# Patient Record
Sex: FEMALE | Race: WHITE | NOT HISPANIC OR LATINO | URBAN - METROPOLITAN AREA
[De-identification: names, ages, dates, MRNs, and addresses within clinical notes are randomized per-mention and may not be internally consistent; named-entity substitution may affect disease eponyms.]

---

## 2020-12-09 ENCOUNTER — OUTPATIENT (OUTPATIENT)
Dept: OUTPATIENT SERVICES | Facility: HOSPITAL | Age: 80
LOS: 1 days | End: 2020-12-09
Payer: MEDICARE

## 2020-12-09 DIAGNOSIS — I34.0 NONRHEUMATIC MITRAL (VALVE) INSUFFICIENCY: ICD-10-CM

## 2020-12-09 DIAGNOSIS — I34.1 NONRHEUMATIC MITRAL (VALVE) PROLAPSE: ICD-10-CM

## 2020-12-09 PROCEDURE — 93325 DOPPLER ECHO COLOR FLOW MAPG: CPT | Mod: 26

## 2020-12-09 PROCEDURE — 76376 3D RENDER W/INTRP POSTPROCES: CPT | Mod: 26

## 2020-12-09 PROCEDURE — 93312 ECHO TRANSESOPHAGEAL: CPT

## 2020-12-09 PROCEDURE — 93312 ECHO TRANSESOPHAGEAL: CPT | Mod: 26

## 2020-12-28 DIAGNOSIS — I34.0 NONRHEUMATIC MITRAL (VALVE) INSUFFICIENCY: ICD-10-CM

## 2020-12-28 DIAGNOSIS — Z86.79 PERSONAL HISTORY OF OTHER DISEASES OF THE CIRCULATORY SYSTEM: ICD-10-CM

## 2020-12-28 DIAGNOSIS — S82.891D OTHER FRACTURE OF RIGHT LOWER LEG, SUBSEQUENT ENCOUNTER FOR CLOSED FRACTURE WITH ROUTINE HEALING: ICD-10-CM

## 2020-12-29 ENCOUNTER — APPOINTMENT (OUTPATIENT)
Dept: CARDIOTHORACIC SURGERY | Facility: CLINIC | Age: 80
End: 2020-12-29
Payer: MEDICARE

## 2020-12-29 DIAGNOSIS — Z86.39 PERSONAL HISTORY OF OTHER ENDOCRINE, NUTRITIONAL AND METABOLIC DISEASE: ICD-10-CM

## 2020-12-29 DIAGNOSIS — Z87.19 PERSONAL HISTORY OF OTHER DISEASES OF THE DIGESTIVE SYSTEM: ICD-10-CM

## 2020-12-29 DIAGNOSIS — Z86.79 PERSONAL HISTORY OF OTHER DISEASES OF THE CIRCULATORY SYSTEM: ICD-10-CM

## 2020-12-29 DIAGNOSIS — Z87.891 PERSONAL HISTORY OF NICOTINE DEPENDENCE: ICD-10-CM

## 2020-12-29 PROCEDURE — 99072 ADDL SUPL MATRL&STAF TM PHE: CPT

## 2020-12-29 PROCEDURE — 99205 OFFICE O/P NEW HI 60 MIN: CPT

## 2020-12-29 PROCEDURE — 99204 OFFICE O/P NEW MOD 45 MIN: CPT

## 2020-12-30 ENCOUNTER — NON-APPOINTMENT (OUTPATIENT)
Age: 80
End: 2020-12-30

## 2020-12-30 PROBLEM — Z87.891 FORMER SMOKER: Status: ACTIVE | Noted: 2020-12-30

## 2020-12-30 PROBLEM — Z86.39 HISTORY OF HYPERCALCEMIA: Status: RESOLVED | Noted: 2020-12-30 | Resolved: 2020-12-30

## 2020-12-30 PROBLEM — Z87.19 HISTORY OF GASTROESOPHAGEAL REFLUX (GERD): Status: RESOLVED | Noted: 2020-12-30 | Resolved: 2020-12-30

## 2020-12-30 PROBLEM — Z86.79 HISTORY OF MITRAL VALVE PROLAPSE: Status: RESOLVED | Noted: 2020-12-30 | Resolved: 2020-12-30

## 2020-12-30 RX ORDER — MULTIVIT-MIN/FOLIC/VIT K/LYCOP 400-300MCG
25 MCG TABLET ORAL DAILY
Refills: 0 | Status: ACTIVE | COMMUNITY

## 2020-12-30 NOTE — REVIEW OF SYSTEMS
[Palpitations] : palpitations [Lower Ext Edema] : lower extremity edema [SOB on Exertion] : shortness of breath during exertion [Negative] : Heme/Lymph

## 2020-12-31 NOTE — PHYSICAL EXAM
[facility] [place]    [id number] ANSHUL MARR    [facility] [place] [address]    Phone:  [phone]    Fax:  [phone]       Thank You for choosing us for your health care visit. We are glad to serve you and happy to provide you with this summary of your visit. Please help us to ensure we have accurate records. If you find anything that needs to be changed, please let our staff know as soon as possible.          Your Demographic Information     Patient Name Sex Sarah Hunt Female 1931       Ethnic Group Patient Race    Not of  or  Origin White      Your Visit Details     Date & Time Provider Department    2017 9:00 AM Saúl Glass MD [facility] [place]      Conditions Discussed Today or Order-Related Diagnoses        Comments    Adhesive capsulitis of right shoulder    -  Primary given depo medrol 80 mg injected into subacromial bursa and given exercised      Your Vitals Were     BP Pulse Weight BMI Smoking Status       100/68 (BP Location: RUE, Patient Position: Sitting, Cuff Size: Regular) 60 148 lb 4.8 oz (67.3 kg) 29.95 kg/m2 Former Smoker       Medications Prescribed or Re-Ordered Today     None      Your Current Medications Are        Disp Refills Start End    simvastatin (ZOCOR) 20 MG tablet 60 tablet 0 2017     Sig - Route: Take 1 tablet by mouth nightly. - Oral    Class: Eprescribe    Notes to Pharmacy: No further refills until evaluated in the clinic. Please call our office to schedule an appointment.    lisinopril (ZESTRIL) 10 MG tablet 60 tablet 0 2017     Sig - Route: Take 1 tablet by mouth daily. - Oral    Class: Eprescribe    Notes to Pharmacy: No further refills until evaluated in the clinic. Please call our office to schedule an appointment.    ibuprofen (MOTRIN) 600 MG tablet 60 tablet 1 2017     Sig: Take 1 tablet by mouth with a meal once to twice daily as needed for pain    Class: Eprescribe    Notes  [General Appearance - Alert] : alert to Pharmacy: No further refills until evaluated in the clinic. Please call our office to schedule an appointment.    gabapentin (NEURONTIN) 100 MG capsule 240 capsule 0 4/25/2017     Sig: Take 2 capsules by mouth twice daily with food    Class: Eprescribe    Notes to Pharmacy: No further refills until evaluated in the clinic. Please call our office to schedule an appointment.    amLODIPine (NORVASC) 5 MG tablet 60 tablet 0 4/25/2017     Sig - Route: Take 1 tablet by mouth daily. - Oral    Class: Eprescribe    Notes to Pharmacy: No further refills until evaluated in the clinic. Please call our office to schedule an appointment.    ALPRAZolam (XANAX) 0.5 MG tablet 30 tablet 1 4/25/2017     Sig - Route: Take 1 tablet by mouth nightly as needed for Sleep. - Oral    ranitidine (ZANTAC) 150 MG tablet 180 tablet 3 6/19/2015     Sig - Route: Take 1 tablet by mouth 2 times daily. - Oral    Class: Eprescribe    GLUCOSAMINE HCL PO        Class: Historical Med    Route: Oral    aspirin 81 MG tablet        Sig - Route: Take 81 mg by mouth 2 times daily. - Oral    Class: Historical Med    lisinopril (ZESTRIL) 10 MG tablet 90 tablet 0 3/3/2017     Sig: TAKE 1 TABLET BY MOUTH DAILY    Class: Eprescribe    albuterol 108 (90 BASE) MCG/ACT inhaler 1 Inhaler 3 1/10/2017     Sig - Route: Inhale 2 puffs into the lungs every 4 hours as needed for Shortness of Breath or Wheezing. - Inhalation    Class: Eprescribe      Allergies     Ambien WEAKNESS    Singulair INSOMNIA      Immunizations History as of 5/26/2017     Name Date    HERPES ZOSTER SHINGLES 10/1/2007    INFLUENZA QUADRIVALENT 10/29/2015    Influenza 9/25/2014, 10/8/2013, 10/9/2012, 10/11/2011, 10/26/2010, 9/29/2009, 10/15/2008, 11/4/2006, 10/11/2005, 10/28/2004, 10/19/2001    Pneumococcal Conjugate 13 Valent 10/29/2015    Pneumococcal Polysaccharide Adult 6/4/1999    Polio, ORAL 6/4/1999, 3/27/1973    Td:Adult type tetanus/diphtheria 1/1/2000    Tdap 10/29/2015 10:33 AM       Problem List as of 5/26/2017     Sciatica of left side    Essential hypertension, benign    Brachial neuritis or radiculitis NOS    Pure hypercholesterolemia    Medicare annual wellness visit, subsequent    Dermatophytosis of nail    Bilateral foot pain            Patient Instructions     None       [General Appearance - In No Acute Distress] : in no acute distress [Sclera] : the sclera and conjunctiva were normal [PERRL With Normal Accommodation] : pupils were equal in size, round, and reactive to light [Extraocular Movements] : extraocular movements were intact [Outer Ear] : the ears and nose were normal in appearance [Oropharynx] : the oropharynx was normal [Neck Appearance] : the appearance of the neck was normal [Neck Cervical Mass (___cm)] : no neck mass was observed [Jugular Venous Distention Increased] : there was no jugular-venous distention [Thyroid Diffuse Enlargement] : the thyroid was not enlarged [Thyroid Nodule] : there were no palpable thyroid nodules [Auscultation Breath Sounds / Voice Sounds] : lungs were clear to auscultation bilaterally [Heart Rate And Rhythm] : heart rate was normal and rhythm regular [Examination Of The Chest] : the chest was normal in appearance [Chest Visual Inspection Thoracic Asymmetry] : no chest asymmetry [Diminished Respiratory Excursion] : normal chest expansion [2+] : left 2+ [No Abnormalities] : the abdominal aorta was not enlarged and no bruit was heard [Bowel Sounds] : normal bowel sounds [Abdomen Soft] : soft [Abdomen Tenderness] : non-tender [Abdomen Mass (___ Cm)] : no abdominal mass palpated [No CVA Tenderness] : no ~M costovertebral angle tenderness [No Spinal Tenderness] : no spinal tenderness [Abnormal Walk] : normal gait [Nail Clubbing] : no clubbing  or cyanosis of the fingernails [Musculoskeletal - Swelling] : no joint swelling seen [Motor Tone] : muscle strength and tone were normal [Skin Color & Pigmentation] : normal skin color and pigmentation [Skin Turgor] : normal skin turgor [] : no rash [Deep Tendon Reflexes (DTR)] : deep tendon reflexes were 2+ and symmetric [Sensation] : the sensory exam was normal to light touch and pinprick [No Focal Deficits] : no focal deficits [Oriented To Time, Place, And Person] : oriented to person, place, and time [Impaired Insight] : insight and judgment were intact [Affect] : the affect was normal [Right Carotid Bruit] : no bruit heard over the right carotid [Left Carotid Bruit] : no bruit heard over the left carotid [Right Femoral Bruit] : no bruit heard over the right femoral artery [Left Femoral Bruit] : no bruit heard over the left femoral artery [FreeTextEntry1] : trace mayra pedal edema

## 2020-12-31 NOTE — DATA REVIEWED
[FreeTextEntry1] : 12/9/20 SARAH: \par 1. Normal left ventricular size and systolic function.\par 2. Normal right ventricular size and systolic function.\par 3. No LA/RA/POLINA/RAA thrombus seen.\par 4. There is mitral valve prolapse of the posterior leaflet (P2 scallop) . The P2 scallop is also flail. There is severe mitral regurgitation seen at a blood pressure of 180/86 mmHg. The jet of mitral regurgitation is holosystolic, eccentric and anteriorly directed. Mean mitral valve gradient is 2.5 mmHg at a HR of 88 bpm and Vmax = 1.4 m/s\par 5. The mitral valve area by 3D planimetry is 6.5 cm2.\par 6. There is suggestion of S wave reversal in the left lower pulmonary vein.\par 7. Mild aortic regurgitation.\par 8. Moderate tricuspid regurgitation.\par 9. No evidence of pulmonary hypertension.\par 10. Intracardiac shunt is present; most consistent with a patent foramen ovale.\par 11. no pericardial effusion\par \par 11/24/20 TTE: \par -LVEF 70%\par -mild LVH\par -normal RV systolic size and function\par -dilated left atrium\par -trace AR\par -posterior leaflet mvp, moderate to severe MR\par -mild-moderate TR\par -mild pulm htn, pas 35 mmHg\par

## 2020-12-31 NOTE — CONSULT LETTER
[Dear  ___] : Dear  [unfilled], [Please see my note below.] : Please see my note below. [Sincerely,] : Sincerely, [FreeTextEntry2] : Caren Barr M.D.\par [address], [address] floor\[place] [address] [FreeTextEntry1] : Please find attached our consultation on your patient, CONCHA HERNANDEZ \par We take a multidisciplinary team approach to patient care and consider you, the referring physician, an extension of our team. We will maintain an open line of communication with you throughout your patient's treatment course.  \par It is our commitment to provide your patient with the highest quality of advanced therapeutic options. We thank you for allowing us to participate in the care of your patient.\par Please do not hesitate to contact our team with any questions or concerns at [phone].\par  [FreeTextEntry3] : Antonio Wilcox MD, FRCS\par \par [facility] \par Department of Cardiothoracic  Surgery \par Director of Robotic Cardiac Surgery\par Professor of Cardiovascular & Thoracic Surgery\par [employer] \par \par [facility]\par

## 2020-12-31 NOTE — PHYSICAL EXAM
[General Appearance - Alert] : alert [General Appearance - In No Acute Distress] : in no acute distress [Sclera] : the sclera and conjunctiva were normal [PERRL With Normal Accommodation] : pupils were equal in size, round, and reactive to light [Extraocular Movements] : extraocular movements were intact [Outer Ear] : the ears and nose were normal in appearance [Oropharynx] : the oropharynx was normal [Neck Appearance] : the appearance of the neck was normal [Neck Cervical Mass (___cm)] : no neck mass was observed [Jugular Venous Distention Increased] : there was no jugular-venous distention [Thyroid Diffuse Enlargement] : the thyroid was not enlarged [Thyroid Nodule] : there were no palpable thyroid nodules [Auscultation Breath Sounds / Voice Sounds] : lungs were clear to auscultation bilaterally [Heart Rate And Rhythm] : heart rate was normal and rhythm regular [Examination Of The Chest] : the chest was normal in appearance [Chest Visual Inspection Thoracic Asymmetry] : no chest asymmetry [Diminished Respiratory Excursion] : normal chest expansion [2+] : left 2+ [No Abnormalities] : the abdominal aorta was not enlarged and no bruit was heard [Bowel Sounds] : normal bowel sounds [Abdomen Soft] : soft [Abdomen Tenderness] : non-tender [Abdomen Mass (___ Cm)] : no abdominal mass palpated [No CVA Tenderness] : no ~M costovertebral angle tenderness [No Spinal Tenderness] : no spinal tenderness [Abnormal Walk] : normal gait [Nail Clubbing] : no clubbing  or cyanosis of the fingernails [Musculoskeletal - Swelling] : no joint swelling seen [Motor Tone] : muscle strength and tone were normal [Skin Color & Pigmentation] : normal skin color and pigmentation [Skin Turgor] : normal skin turgor [] : no rash [Deep Tendon Reflexes (DTR)] : deep tendon reflexes were 2+ and symmetric [Sensation] : the sensory exam was normal to light touch and pinprick [No Focal Deficits] : no focal deficits [Oriented To Time, Place, And Person] : oriented to person, place, and time [Impaired Insight] : insight and judgment were intact [Affect] : the affect was normal [Right Carotid Bruit] : no bruit heard over the right carotid [Left Carotid Bruit] : no bruit heard over the left carotid [Right Femoral Bruit] : no bruit heard over the right femoral artery [Left Femoral Bruit] : no bruit heard over the left femoral artery [FreeTextEntry1] : trace mayra pedal edema

## 2020-12-31 NOTE — END OF VISIT
[FreeTextEntry3] : I, TANGELA JIM , am scribing for and in the presence of KRISTIE ALVARENGA the following sections: History of present illness, past Medical/family/surgical/family/social history, review of systems, vital signs, physical exam and disposition.\par I personally performed the services described in the documentation, reviewed the documentation recorded by the scribe in my presence and it accurately and completely records my words and actions.\par

## 2020-12-31 NOTE — ASSESSMENT
[FreeTextEntry1] : 80 year old female with history of HTN, hypercalcemia and mitral valve prolapse presents with severe MR with worsening SOB /GRAMAJO and palpitations.  Dr. Wilcox reviewed the echocardiogram images with the patient and discussed the case with Dr. Aburto.  Dr. Wilcox discussed the risks, benefits and alternatives to surgery and the various surgical approaches, minimally invasive versus sternotomy.  Risks include but not limited to death, heart attack, bleeding, stroke, kidney problems and infection.  Dr. Wilcox quoted a 2-3% operative mortality and complication risk and 98% chance that the mitral valve can be repaired. He discussed the need for left heart cardiac catherization prior to surgery for evaluate for coronary artery disease. If she has no CAD the approach will be minimally invasive. Dr. Wilcox feels the patient will benefit from and is a candidate for mitral valve repair.  All questions were addressed. The patient will discuss with her family and call us to schedule. \par \par Plan: \par Covid test \par admit 1 day prior for LHC via radial artery\par \par \par

## 2020-12-31 NOTE — HISTORY OF PRESENT ILLNESS
[FreeTextEntry1] : 80 year old female with a history of hypertension, GERD, chronic diastolic heart failure with severe mitral regurgitation who was referred for further evaluation of her valvular disease. \par \par The patient states, for the last six months, she has experienced shortness of breath after walking up a flight of stairs. She denies any SOB at rest, chest pain, palpitations, dizziness, syncope, or LE edema. \par \par The patient had an outpatient ECHO on 11/2020 which showed moderate to severe MR, followed by a SARAH on 12/2020 which confirmed severe mitral regurgitation. \par \par The patient lives with her son and remains independent in all her ADLs

## 2020-12-31 NOTE — HISTORY OF PRESENT ILLNESS
[FreeTextEntry1] : 80 year old female with PMH of htn, hypercalcemia and mitral valve prolapse presents with severe mitral regurgitation. Patient followed by PCP, Dr. Dsouza and cardiologist Dr. Barr. She has known of mvp with heart murmur x many years. She reports that recently experiencing GRAMAJO when going up 2 flights of stairs to her apt, prn palpitations and lower extremity edema. NYHA II. She denies c/o chest pain, sob at rest, fever, orthopnea, PND or syncope. She lives with her son and remains independent in ADLs. TTE in November 2020 revealed EF 70% and moderate to severe MR. SARAH December 2020 revealed severe MVP w/severe MR and flail segment of posterior leaflet. \par \par She presents today for surgical consult with Dr. Wilcox. She appears generally well, NAD.

## 2021-01-04 ENCOUNTER — NON-APPOINTMENT (OUTPATIENT)
Age: 81
End: 2021-01-04

## 2021-01-25 ENCOUNTER — APPOINTMENT (OUTPATIENT)
Dept: DISASTER EMERGENCY | Facility: CLINIC | Age: 81
End: 2021-01-25

## 2021-01-27 ENCOUNTER — APPOINTMENT (OUTPATIENT)
Dept: CARDIOTHORACIC SURGERY | Facility: HOSPITAL | Age: 81
End: 2021-01-27

## 2021-01-27 LAB — SARS-COV-2 N GENE NPH QL NAA+PROBE: NOT DETECTED

## 2021-02-10 ENCOUNTER — NON-APPOINTMENT (OUTPATIENT)
Age: 81
End: 2021-02-10

## 2021-03-26 ENCOUNTER — APPOINTMENT (OUTPATIENT)
Dept: DISASTER EMERGENCY | Facility: CLINIC | Age: 81
End: 2021-03-26

## 2021-03-27 LAB — SARS-COV-2 N GENE NPH QL NAA+PROBE: NOT DETECTED

## 2021-03-30 ENCOUNTER — TRANSCRIPTION ENCOUNTER (OUTPATIENT)
Age: 81
End: 2021-03-30

## 2021-03-30 ENCOUNTER — INPATIENT (INPATIENT)
Facility: HOSPITAL | Age: 81
LOS: 4 days | Discharge: HOME HEALTH | DRG: 216 | End: 2021-04-04
Attending: THORACIC SURGERY (CARDIOTHORACIC VASCULAR SURGERY) | Admitting: THORACIC SURGERY (CARDIOTHORACIC VASCULAR SURGERY)
Payer: MEDICARE

## 2021-03-30 VITALS
HEART RATE: 97 BPM | TEMPERATURE: 97 F | RESPIRATION RATE: 18 BRPM | DIASTOLIC BLOOD PRESSURE: 86 MMHG | SYSTOLIC BLOOD PRESSURE: 170 MMHG | OXYGEN SATURATION: 98 %

## 2021-03-30 LAB
A1C WITH ESTIMATED AVERAGE GLUCOSE RESULT: 5.3 % — SIGNIFICANT CHANGE UP (ref 4–5.6)
ALBUMIN SERPL ELPH-MCNC: 4.1 G/DL — SIGNIFICANT CHANGE UP (ref 3.3–5)
ALP SERPL-CCNC: 71 U/L — SIGNIFICANT CHANGE UP (ref 40–120)
ALT FLD-CCNC: 14 U/L — SIGNIFICANT CHANGE UP (ref 10–45)
ANION GAP SERPL CALC-SCNC: 11 MMOL/L — SIGNIFICANT CHANGE UP (ref 5–17)
ANISOCYTOSIS BLD QL: SLIGHT — SIGNIFICANT CHANGE UP
APPEARANCE UR: CLEAR — SIGNIFICANT CHANGE UP
APTT BLD: 29.7 SEC — SIGNIFICANT CHANGE UP (ref 27.5–35.5)
AST SERPL-CCNC: 20 U/L — SIGNIFICANT CHANGE UP (ref 10–40)
BASOPHILS # BLD AUTO: 0 K/UL — SIGNIFICANT CHANGE UP (ref 0–0.2)
BASOPHILS NFR BLD AUTO: 0 % — SIGNIFICANT CHANGE UP (ref 0–2)
BILIRUB SERPL-MCNC: 0.5 MG/DL — SIGNIFICANT CHANGE UP (ref 0.2–1.2)
BILIRUB UR-MCNC: NEGATIVE — SIGNIFICANT CHANGE UP
BLD GP AB SCN SERPL QL: NEGATIVE — SIGNIFICANT CHANGE UP
BUN SERPL-MCNC: 12 MG/DL — SIGNIFICANT CHANGE UP (ref 7–23)
CALCIUM SERPL-MCNC: 9.3 MG/DL — SIGNIFICANT CHANGE UP (ref 8.4–10.5)
CHLORIDE SERPL-SCNC: 105 MMOL/L — SIGNIFICANT CHANGE UP (ref 96–108)
CHOLEST SERPL-MCNC: 184 MG/DL — SIGNIFICANT CHANGE UP
CK MB CFR SERPL CALC: 2.6 NG/ML — SIGNIFICANT CHANGE UP (ref 0–6.7)
CK SERPL-CCNC: 64 U/L — SIGNIFICANT CHANGE UP (ref 25–170)
CO2 SERPL-SCNC: 27 MMOL/L — SIGNIFICANT CHANGE UP (ref 22–31)
COLOR SPEC: YELLOW — SIGNIFICANT CHANGE UP
CREAT SERPL-MCNC: 0.97 MG/DL — SIGNIFICANT CHANGE UP (ref 0.5–1.3)
CRP SERPL-MCNC: 0.9 MG/L — SIGNIFICANT CHANGE UP (ref 0–4)
DIFF PNL FLD: NEGATIVE — SIGNIFICANT CHANGE UP
EOSINOPHIL # BLD AUTO: 0 K/UL — SIGNIFICANT CHANGE UP (ref 0–0.5)
EOSINOPHIL NFR BLD AUTO: 0 % — SIGNIFICANT CHANGE UP (ref 0–6)
ESTIMATED AVERAGE GLUCOSE: 105 MG/DL — SIGNIFICANT CHANGE UP (ref 68–114)
GLUCOSE SERPL-MCNC: 102 MG/DL — HIGH (ref 70–99)
GLUCOSE UR QL: NEGATIVE — SIGNIFICANT CHANGE UP
HCT VFR BLD CALC: 45.3 % — HIGH (ref 34.5–45)
HDLC SERPL-MCNC: 93 MG/DL — SIGNIFICANT CHANGE UP
HGB BLD-MCNC: 14.3 G/DL — SIGNIFICANT CHANGE UP (ref 11.5–15.5)
HYPOCHROMIA BLD QL: SLIGHT — SIGNIFICANT CHANGE UP
INR BLD: 0.96 — SIGNIFICANT CHANGE UP (ref 0.88–1.16)
KETONES UR-MCNC: ABNORMAL MG/DL
LEUKOCYTE ESTERASE UR-ACNC: NEGATIVE — SIGNIFICANT CHANGE UP
LIPID PNL WITH DIRECT LDL SERPL: 72 MG/DL — SIGNIFICANT CHANGE UP
LYMPHOCYTES # BLD AUTO: 1.47 K/UL — SIGNIFICANT CHANGE UP (ref 1–3.3)
LYMPHOCYTES # BLD AUTO: 20.4 % — SIGNIFICANT CHANGE UP (ref 13–44)
MACROCYTES BLD QL: SLIGHT — SIGNIFICANT CHANGE UP
MANUAL SMEAR VERIFICATION: SIGNIFICANT CHANGE UP
MCHC RBC-ENTMCNC: 31.6 GM/DL — LOW (ref 32–36)
MCHC RBC-ENTMCNC: 33.4 PG — SIGNIFICANT CHANGE UP (ref 27–34)
MCV RBC AUTO: 105.8 FL — HIGH (ref 80–100)
MONOCYTES # BLD AUTO: 0.7 K/UL — SIGNIFICANT CHANGE UP (ref 0–0.9)
MONOCYTES NFR BLD AUTO: 9.7 % — SIGNIFICANT CHANGE UP (ref 2–14)
NEUTROPHILS # BLD AUTO: 4.15 K/UL — SIGNIFICANT CHANGE UP (ref 1.8–7.4)
NEUTROPHILS NFR BLD AUTO: 57.5 % — SIGNIFICANT CHANGE UP (ref 43–77)
NITRITE UR-MCNC: NEGATIVE — SIGNIFICANT CHANGE UP
NON HDL CHOLESTEROL: 91 MG/DL — SIGNIFICANT CHANGE UP
PH UR: 5.5 — SIGNIFICANT CHANGE UP (ref 5–8)
PLAT MORPH BLD: ABNORMAL
PLATELET # BLD AUTO: 191 K/UL — SIGNIFICANT CHANGE UP (ref 150–400)
POLYCHROMASIA BLD QL SMEAR: SLIGHT — SIGNIFICANT CHANGE UP
POTASSIUM SERPL-MCNC: 4 MMOL/L — SIGNIFICANT CHANGE UP (ref 3.5–5.3)
POTASSIUM SERPL-SCNC: 4 MMOL/L — SIGNIFICANT CHANGE UP (ref 3.5–5.3)
PROT SERPL-MCNC: 7.1 G/DL — SIGNIFICANT CHANGE UP (ref 6–8.3)
PROT UR-MCNC: NEGATIVE MG/DL — SIGNIFICANT CHANGE UP
PROTHROM AB SERPL-ACNC: 11.5 SEC — SIGNIFICANT CHANGE UP (ref 10.6–13.6)
RBC # BLD: 4.28 M/UL — SIGNIFICANT CHANGE UP (ref 3.8–5.2)
RBC # FLD: 13.1 % — SIGNIFICANT CHANGE UP (ref 10.3–14.5)
RBC BLD AUTO: ABNORMAL
RH IG SCN BLD-IMP: POSITIVE — SIGNIFICANT CHANGE UP
SMUDGE CELLS # BLD: PRESENT — SIGNIFICANT CHANGE UP
SODIUM SERPL-SCNC: 143 MMOL/L — SIGNIFICANT CHANGE UP (ref 135–145)
SP GR SPEC: 1.01 — SIGNIFICANT CHANGE UP (ref 1–1.03)
STOMATOCYTES BLD QL SMEAR: SLIGHT — SIGNIFICANT CHANGE UP
TRIGL SERPL-MCNC: 97 MG/DL — SIGNIFICANT CHANGE UP
UROBILINOGEN FLD QL: 0.2 E.U./DL — SIGNIFICANT CHANGE UP
VARIANT LYMPHS # BLD: 12.4 % — HIGH (ref 0–6)
WBC # BLD: 7.21 K/UL — SIGNIFICANT CHANGE UP (ref 3.8–10.5)
WBC # FLD AUTO: 7.21 K/UL — SIGNIFICANT CHANGE UP (ref 3.8–10.5)

## 2021-03-30 PROCEDURE — 94010 BREATHING CAPACITY TEST: CPT | Mod: 26

## 2021-03-30 PROCEDURE — 99152 MOD SED SAME PHYS/QHP 5/>YRS: CPT

## 2021-03-30 PROCEDURE — 93880 EXTRACRANIAL BILAT STUDY: CPT | Mod: 26

## 2021-03-30 PROCEDURE — 99232 SBSQ HOSP IP/OBS MODERATE 35: CPT | Mod: 57

## 2021-03-30 PROCEDURE — 93454 CORONARY ARTERY ANGIO S&I: CPT | Mod: 26

## 2021-03-30 PROCEDURE — 71046 X-RAY EXAM CHEST 2 VIEWS: CPT | Mod: 26

## 2021-03-30 RX ORDER — CHLORHEXIDINE GLUCONATE 213 G/1000ML
1 SOLUTION TOPICAL ONCE
Refills: 0 | Status: DISCONTINUED | OUTPATIENT
Start: 2021-03-30 | End: 2021-03-31

## 2021-03-30 RX ORDER — ACETAMINOPHEN 500 MG
650 TABLET ORAL EVERY 6 HOURS
Refills: 0 | Status: DISCONTINUED | OUTPATIENT
Start: 2021-03-30 | End: 2021-03-31

## 2021-03-30 RX ORDER — METOPROLOL TARTRATE 50 MG
12.5 TABLET ORAL EVERY 12 HOURS
Refills: 0 | Status: DISCONTINUED | OUTPATIENT
Start: 2021-03-30 | End: 2021-03-31

## 2021-03-30 RX ORDER — CHLORHEXIDINE GLUCONATE 213 G/1000ML
1 SOLUTION TOPICAL ONCE
Refills: 0 | Status: COMPLETED | OUTPATIENT
Start: 2021-03-30 | End: 2021-03-31

## 2021-03-30 RX ORDER — CHLORHEXIDINE GLUCONATE 213 G/1000ML
1 SOLUTION TOPICAL ONCE
Refills: 0 | Status: COMPLETED | OUTPATIENT
Start: 2021-03-30 | End: 2021-03-30

## 2021-03-30 RX ORDER — PANTOPRAZOLE SODIUM 20 MG/1
40 TABLET, DELAYED RELEASE ORAL
Refills: 0 | Status: DISCONTINUED | OUTPATIENT
Start: 2021-03-30 | End: 2021-03-31

## 2021-03-30 RX ORDER — ASPIRIN/CALCIUM CARB/MAGNESIUM 324 MG
81 TABLET ORAL ONCE
Refills: 0 | Status: DISCONTINUED | OUTPATIENT
Start: 2021-03-30 | End: 2021-03-30

## 2021-03-30 RX ORDER — SODIUM CHLORIDE 9 MG/ML
500 INJECTION INTRAMUSCULAR; INTRAVENOUS; SUBCUTANEOUS
Refills: 0 | Status: DISCONTINUED | OUTPATIENT
Start: 2021-03-30 | End: 2021-03-31

## 2021-03-30 RX ORDER — HEPARIN SODIUM 5000 [USP'U]/ML
5000 INJECTION INTRAVENOUS; SUBCUTANEOUS EVERY 8 HOURS
Refills: 0 | Status: DISCONTINUED | OUTPATIENT
Start: 2021-03-30 | End: 2021-03-31

## 2021-03-30 RX ORDER — SODIUM CHLORIDE 9 MG/ML
3 INJECTION INTRAMUSCULAR; INTRAVENOUS; SUBCUTANEOUS EVERY 8 HOURS
Refills: 0 | Status: DISCONTINUED | OUTPATIENT
Start: 2021-03-30 | End: 2021-03-31

## 2021-03-30 RX ORDER — ASPIRIN/CALCIUM CARB/MAGNESIUM 324 MG
81 TABLET ORAL DAILY
Refills: 0 | Status: DISCONTINUED | OUTPATIENT
Start: 2021-03-30 | End: 2021-03-31

## 2021-03-30 RX ORDER — CHLORHEXIDINE GLUCONATE 213 G/1000ML
10 SOLUTION TOPICAL ONCE
Refills: 0 | Status: COMPLETED | OUTPATIENT
Start: 2021-03-30 | End: 2021-03-31

## 2021-03-30 RX ORDER — SENNA PLUS 8.6 MG/1
2 TABLET ORAL AT BEDTIME
Refills: 0 | Status: DISCONTINUED | OUTPATIENT
Start: 2021-03-30 | End: 2021-03-31

## 2021-03-30 RX ORDER — CLOPIDOGREL BISULFATE 75 MG/1
600 TABLET, FILM COATED ORAL ONCE
Refills: 0 | Status: DISCONTINUED | OUTPATIENT
Start: 2021-03-30 | End: 2021-03-30

## 2021-03-30 RX ADMIN — Medication 12.5 MILLIGRAM(S): at 19:37

## 2021-03-30 RX ADMIN — HEPARIN SODIUM 5000 UNIT(S): 5000 INJECTION INTRAVENOUS; SUBCUTANEOUS at 23:31

## 2021-03-30 RX ADMIN — SODIUM CHLORIDE 3 MILLILITER(S): 9 INJECTION INTRAMUSCULAR; INTRAVENOUS; SUBCUTANEOUS at 21:41

## 2021-03-30 RX ADMIN — CHLORHEXIDINE GLUCONATE 1 APPLICATION(S): 213 SOLUTION TOPICAL at 21:40

## 2021-03-30 RX ADMIN — CHLORHEXIDINE GLUCONATE 1 APPLICATION(S): 213 SOLUTION TOPICAL at 19:37

## 2021-03-30 RX ADMIN — SODIUM CHLORIDE 75 MILLILITER(S): 9 INJECTION INTRAMUSCULAR; INTRAVENOUS; SUBCUTANEOUS at 13:17

## 2021-03-30 NOTE — H&P ADULT - NSHPPHYSICALEXAM_GEN_ALL_CORE
Mallampatti score: 2  ASA class: 2  CCS anginal class: 2    EKG:    This patient is a candidate for moderate sedation. Mallampatti score: 2  ASA class: 2  CCS anginal class: 2    EKG:     This patient is a candidate for moderate sedation. Mallampatti score: 2  ASA class: 2  CCS anginal class: 2    EKG: NSR @92bpm w/ TWI in lead III, and P wave inversion in V2.    This patient is a candidate for moderate sedation.

## 2021-03-30 NOTE — H&P ADULT - NSICDXPASTMEDICALHX_GEN_ALL_CORE_FT
PAST MEDICAL HISTORY:  HLD (hyperlipidemia)     HTN (hypertension)     Mitral prolapse     Mitral regurgitation

## 2021-03-30 NOTE — H&P ADULT - HISTORY OF PRESENT ILLNESS
Cardiologist: Dr. Caren Barr  Pharmacy:   COVID:   Escort:     79 y/o Female with PMHx HTN, MVP, severe MR, and hypercalcemia, presented to Dr. Wilcox for surgical evaluation of severe MR. Pt endorses GRAMAJO after 2 flights of stairs, as well as palpitations and lower extremity edema. She denies CP, SOB at rest, orthopnea/PND, syncope, dizziness, fever, chills, cough, or recent illness. TTE 11/2020 revealed EF 70%, mod-severe MR. Subsequent SARAH 12/9/2020 revealed severe MVP w/severe MR and flail segment of posterior leaflet.  In light of patient's risk factors, and CCS class 2 anginal/anginal-equivalent symptoms, pt is referred to [facility] for cardiac catheterization w/ possible intervention if clinically indicated to r/o CAD with plan for subsequent admission to [facility] for MVR.    Risks & benefits of procedure and alternative therapy have been explained to the patient including but not limited to: allergic reaction, bleeding with possible need for blood transfusion, infection, renal and vascular compromise, limb damage, arrhythmia, stroke, vessel dissection/perforation, myocardial infarction, and emergent CABG. Informed consent obtained at bedside and included in chart. Cardiologist: Dr. Caren Barr  Pharmacy: [facility] pharmacy [phone]  COVID: Negative 3/30 in [facility]IE  Escort: Son Azam    *Medications confirmed with pharmacy    81 y/o Female with PMHx HTN, MVP, severe MR, and hypercalcemia, presented to Dr. Wilcox for surgical evaluation of severe MR. Pt endorses GRAMAJO after 2 flights of stairs, as well as palpitations and lower extremity edema. She denies CP, SOB at rest, orthopnea/PND, syncope, dizziness, fever, chills, cough, or recent illness. Pt reports good compliance with all medications including Aspirin 81mg daily, which she states she last took the morning of 3/29. TTE 11/2020 revealed EF 70%, mod-severe MR. Subsequent SARAH 12/9/2020 revealed severe MVP w/severe MR and flail segment of posterior leaflet.  In light of patient's risk factors, and CCS class 2 anginal/anginal-equivalent symptoms, pt is referred to [facility] for cardiac catheterization w/ possible intervention if clinically indicated to r/o CAD with plan for subsequent admission to [facility] for MVR.    Risks & benefits of procedure and alternative therapy have been explained to the patient including but not limited to: allergic reaction, bleeding with possible need for blood transfusion, infection, renal and vascular compromise, limb damage, arrhythmia, stroke, vessel dissection/perforation, myocardial infarction, and emergent CABG. Informed consent obtained at bedside and included in chart.

## 2021-03-30 NOTE — PROGRESS NOTE ADULT - SUBJECTIVE AND OBJECTIVE BOX
Planned Date of Surgery: 3/31/21                                                                                                                 Surgeon: Dr. Wilcox     Procedure: MVR     HPI:  79 y/o Female with PMHx HTN, MVP, severe MR, and hypercalcemia, presented to Dr. Wilcox for surgical evaluation of severe MR. Pt endorses GRAMAJO after 2 flights of stairs, as well as palpitations and lower extremity edema. She denies CP, SOB at rest, orthopnea/PND, syncope, dizziness, fever, chills, cough, or recent illness. Pt reports good compliance with all medications including Aspirin 81mg daily, which she states she last took the morning of 3/29. TTE 11/2020 revealed EF 70%, mod-severe MR. Subsequent SARAH 12/9/2020 revealed severe MVP w/severe MR and flail segment of posterior leaflet. Patient referred to Dr. Wilcox for surgical evaluation and deemed a good surgical candidate and presented to cath lab today for diagnostic cath which revealed clean coronaries. Patient being admitted to  to complete PST in anticipation for OR tomorrow.     PAST MEDICAL & SURGICAL HISTORY:  HTN (hypertension)    HLD (hyperlipidemia)    Mitral regurgitation    Mitral prolapse    No Known Allergies  opioid-like analgesics (Stomach Upset; Vomiting; Nausea)  Valium (Other)    Physical Exam  T(C): 36.3 (03-30-21 @ 11:48), Max: 36.3 (03-30-21 @ 11:48)  HR: 97 (03-30-21 @ 11:48) (97 - 97)  BP: 170/86 (03-30-21 @ 11:48) (170/86 - 170/86)  RR: 18 (03-30-21 @ 11:48) (18 - 18)  SpO2: 98% (03-30-21 @ 11:48) (98% - 98%)  Constitutional: well appearing resting in bed in NAD   Neuro: AOx3, motor skills grossly in tact  CV: +S1/S2, +diastolic murmur heard  Pulmonary: Lungs CTA b/l, no wheezing   GI: +BS 4 quadrants soft, non-tender, non-distended.   Extremities: No edema. Normal strength.    MEDICATIONS  (STANDING):  sodium chloride 0.9% lock flush 3 milliLiter(s) IV Push every 8 hours  sodium chloride 0.9%. 500 milliLiter(s) (75 mL/Hr) IV Continuous <Continuous>    MEDICATIONS  (PRN):    On Beta Blocker? no    Labs:                        14.3   7.21  )-----------( 191      ( 30 Mar 2021 12:51 )             45.3     03-30    143  |  105  |  12  ----------------------------<  102<H>  4.0   |  27  |  0.97    Ca    9.3      30 Mar 2021 12:51    TPro  7.1  /  Alb  4.1  /  TBili  0.5  /  DBili  x   /  AST  20  /  ALT  14  /  AlkPhos  71  03-30    PT/INR - ( 30 Mar 2021 12:51 )   PT: 11.5 sec;   INR: 0.96          PTT - ( 30 Mar 2021 12:51 )  PTT:29.7 sec        CARDIAC MARKERS ( 30 Mar 2021 12:51 )  x     / x     / 64 U/L / x     / 2.6 ng/mL      Hgb A1C: 5.3     EKG: in chart     CXR: pending     CT Scans: n/a     Cath Report:     Echo:    PFT's:    Carotid Duplex:    Consult in Chart?  YES / NO  Consent in Chart? YES / NO  Pre-op Orders Placed? YES / NO  Blood Prodeucts Ordered? YES / NO  NPO ordered? YES / NO Planned Date of Surgery: 3/31/21                                                                                                                 Surgeon: Dr. Wilcox     Procedure: MVR     HPI:  81 y/o Female with PMHx HTN, MVP, severe MR, and hypercalcemia, presented to Dr. Wilcox for surgical evaluation of severe MR. Pt endorses GRAMAJO after 2 flights of stairs, as well as palpitations and lower extremity edema. She denies CP, SOB at rest, orthopnea/PND, syncope, dizziness, fever, chills, cough, or recent illness. Pt reports good compliance with all medications including Aspirin 81mg daily, which she states she last took the morning of 3/29. TTE 11/2020 revealed EF 70%, mod-severe MR. Subsequent SARAH 12/9/2020 revealed severe MVP w/severe MR and flail segment of posterior leaflet. Patient referred to Dr. Wilcox for surgical evaluation and deemed a good surgical candidate and presented to cath lab today for diagnostic cath which revealed clean coronaries. Patient being admitted to  to complete PST in anticipation for OR tomorrow.     PAST MEDICAL & SURGICAL HISTORY:  HTN (hypertension)    HLD (hyperlipidemia)    Mitral regurgitation    Mitral prolapse    No Known Allergies  opioid-like analgesics (Stomach Upset; Vomiting; Nausea)  Valium (Other)    Physical Exam  T(C): 36.3 (03-30-21 @ 11:48), Max: 36.3 (03-30-21 @ 11:48)  HR: 97 (03-30-21 @ 11:48) (97 - 97)  BP: 170/86 (03-30-21 @ 11:48) (170/86 - 170/86)  RR: 18 (03-30-21 @ 11:48) (18 - 18)  SpO2: 98% (03-30-21 @ 11:48) (98% - 98%)  Constitutional: well appearing resting in bed in NAD   Neuro: AOx3, motor skills grossly in tact  CV: +S1/S2, +diastolic murmur heard  Pulmonary: Lungs CTA b/l, no wheezing   GI: +BS 4 quadrants soft, non-tender, non-distended.   Extremities: No edema. Normal strength.    MEDICATIONS  (STANDING):  sodium chloride 0.9% lock flush 3 milliLiter(s) IV Push every 8 hours  sodium chloride 0.9%. 500 milliLiter(s) (75 mL/Hr) IV Continuous <Continuous>    MEDICATIONS  (PRN):    On Beta Blocker? no    Labs:                        14.3   7.21  )-----------( 191      ( 30 Mar 2021 12:51 )             45.3     03-30    143  |  105  |  12  ----------------------------<  102<H>  4.0   |  27  |  0.97    Ca    9.3      30 Mar 2021 12:51    TPro  7.1  /  Alb  4.1  /  TBili  0.5  /  DBili  x   /  AST  20  /  ALT  14  /  AlkPhos  71  03-30    PT/INR - ( 30 Mar 2021 12:51 )   PT: 11.5 sec;   INR: 0.96          PTT - ( 30 Mar 2021 12:51 )  PTT:29.7 sec        CARDIAC MARKERS ( 30 Mar 2021 12:51 )  x     / x     / 64 U/L / x     / 2.6 ng/mL      Hgb A1C: 5.3     EKG: in chart     CXR: pending     CT Scans: n/a     Cath Report: normal coronaries     Echo:    PFT's:    Carotid Duplex:    Consult in Chart?  YES / NO  Consent in Chart? YES / NO  Pre-op Orders Placed? YES / NO  Blood Prodeucts Ordered? YES / NO  NPO ordered? YES / NO Planned Date of Surgery: 3/31/21                                                                                                                 Surgeon: Dr. Wilcox     Procedure: MVR     HPI:  79 y/o Female with PMHx HTN, MVP, severe MR, and hypercalcemia, presented to Dr. Wilcox for surgical evaluation of severe MR. Pt endorses GRAMAJO after 2 flights of stairs, as well as palpitations and lower extremity edema. She denies CP, SOB at rest, orthopnea/PND, syncope, dizziness, fever, chills, cough, or recent illness. Pt reports good compliance with all medications including Aspirin 81mg daily, which she states she last took the morning of 3/29. TTE 11/2020 revealed EF 70%, mod-severe MR. Subsequent SARAH 12/9/2020 revealed severe MVP w/severe MR and flail segment of posterior leaflet. Patient referred to Dr. Wilcox for surgical evaluation and deemed a good surgical candidate and presented to cath lab today for diagnostic cath which revealed clean coronaries. Patient being admitted to  to complete PST in anticipation for OR tomorrow.     PAST MEDICAL & SURGICAL HISTORY:  HTN (hypertension)    HLD (hyperlipidemia)    Mitral regurgitation    Mitral prolapse    No Known Allergies  opioid-like analgesics (Stomach Upset; Vomiting; Nausea)  Valium (Other)    Physical Exam  T(C): 36.3 (03-30-21 @ 11:48), Max: 36.3 (03-30-21 @ 11:48)  HR: 97 (03-30-21 @ 11:48) (97 - 97)  BP: 170/86 (03-30-21 @ 11:48) (170/86 - 170/86)  RR: 18 (03-30-21 @ 11:48) (18 - 18)  SpO2: 98% (03-30-21 @ 11:48) (98% - 98%)  Constitutional: well appearing resting in bed in NAD   Neuro: AOx3, motor skills grossly in tact  CV: +S1/S2, +diastolic murmur heard  Pulmonary: Lungs CTA b/l, no wheezing   GI: +BS 4 quadrants soft, non-tender, non-distended.   Extremities: No edema. Normal strength.    MEDICATIONS  (STANDING):  sodium chloride 0.9% lock flush 3 milliLiter(s) IV Push every 8 hours  sodium chloride 0.9%. 500 milliLiter(s) (75 mL/Hr) IV Continuous <Continuous>    MEDICATIONS  (PRN):    On Beta Blocker? no    Labs:                        14.3   7.21  )-----------( 191      ( 30 Mar 2021 12:51 )             45.3     03-30    143  |  105  |  12  ----------------------------<  102<H>  4.0   |  27  |  0.97    Ca    9.3      30 Mar 2021 12:51    TPro  7.1  /  Alb  4.1  /  TBili  0.5  /  DBili  x   /  AST  20  /  ALT  14  /  AlkPhos  71  03-30    PT/INR - ( 30 Mar 2021 12:51 )   PT: 11.5 sec;   INR: 0.96          PTT - ( 30 Mar 2021 12:51 )  PTT:29.7 sec        CARDIAC MARKERS ( 30 Mar 2021 12:51 )  x     / x     / 64 U/L / x     / 2.6 ng/mL      Hgb A1C: 5.3     EKG: in chart     CXR: pending     CT Scans: n/a     Cath Report: normal coronaries     Echo: n/a     PFT's: pending     Carotid Duplex: pending     Consult in Chart?  YES  Consent in Chart? YES   Pre-op Orders Placed? YES   Blood Products Ordered? YES   NPO ordered? YES

## 2021-03-30 NOTE — H&P ADULT - ASSESSMENT
81 y/o Female with PMHx HTN, MVP, severe MR, and hypercalcemia, presented to Dr. Wilcox for surgical evaluation of severe MR. Pt endorses GRAMAJO after 2 flights of stairs, as well as palpitations and lower extremity edema. TTE 11/2020 revealed EF 70%, mod-severe MR. Subsequent SARAH 12/9/2020 revealed severe MVP w/severe MR and flail segment of posterior leaflet.  In light of patient's risk factors, and CCS class 2 anginal/anginal-equivalent symptoms, pt is referred to [facility] for cardiac catheterization w/ possible intervention if clinically indicated to r/o CAD with plan for subsequent admission to [facility] for MVR.

## 2021-03-30 NOTE — PATIENT PROFILE ADULT - TRANSPORTATION
.Patient is returning call, from Dr. Steiner's nurse yesterday 2/13/2020.  Patient did not know the reason for the phone call, and writer did not find any documentation.    Callback Number: [phone]  Best Availability: any  Can A Detailed Message Be Left? yes  Did you confirm the message with the caller?: yes    Thank you,  Khadijah Coy      
This was in regards to Inna Salazar, please see telephone encounter from 2/11/2020 on this patient, this has been resolved, nothing further needed.  
no

## 2021-03-31 ENCOUNTER — RESULT REVIEW (OUTPATIENT)
Age: 81
End: 2021-03-31

## 2021-03-31 ENCOUNTER — APPOINTMENT (OUTPATIENT)
Dept: CARDIOTHORACIC SURGERY | Facility: HOSPITAL | Age: 81
End: 2021-03-31

## 2021-03-31 LAB
ALBUMIN SERPL ELPH-MCNC: 2.9 G/DL — LOW (ref 3.3–5)
ALP SERPL-CCNC: 54 U/L — SIGNIFICANT CHANGE UP (ref 40–120)
ALT FLD-CCNC: 13 U/L — SIGNIFICANT CHANGE UP (ref 10–45)
ANION GAP SERPL CALC-SCNC: 11 MMOL/L — SIGNIFICANT CHANGE UP (ref 5–17)
ANION GAP SERPL CALC-SCNC: 13 MMOL/L — SIGNIFICANT CHANGE UP (ref 5–17)
ANION GAP SERPL CALC-SCNC: 8 MMOL/L — SIGNIFICANT CHANGE UP (ref 5–17)
APTT BLD: 28.7 SEC — SIGNIFICANT CHANGE UP (ref 27.5–35.5)
APTT BLD: 29.8 SEC — SIGNIFICANT CHANGE UP (ref 27.5–35.5)
APTT BLD: 43.8 SEC — HIGH (ref 27.5–35.5)
AST SERPL-CCNC: 42 U/L — HIGH (ref 10–40)
BASOPHILS # BLD AUTO: 0.04 K/UL — SIGNIFICANT CHANGE UP (ref 0–0.2)
BASOPHILS NFR BLD AUTO: 0.2 % — SIGNIFICANT CHANGE UP (ref 0–2)
BILIRUB SERPL-MCNC: 0.3 MG/DL — SIGNIFICANT CHANGE UP (ref 0.2–1.2)
BLD GP AB SCN SERPL QL: NEGATIVE — SIGNIFICANT CHANGE UP
BUN SERPL-MCNC: 11 MG/DL — SIGNIFICANT CHANGE UP (ref 7–23)
BUN SERPL-MCNC: 11 MG/DL — SIGNIFICANT CHANGE UP (ref 7–23)
BUN SERPL-MCNC: 14 MG/DL — SIGNIFICANT CHANGE UP (ref 7–23)
CALCIUM SERPL-MCNC: 7.9 MG/DL — LOW (ref 8.4–10.5)
CALCIUM SERPL-MCNC: 8.4 MG/DL — SIGNIFICANT CHANGE UP (ref 8.4–10.5)
CALCIUM SERPL-MCNC: 9 MG/DL — SIGNIFICANT CHANGE UP (ref 8.4–10.5)
CHLORIDE SERPL-SCNC: 107 MMOL/L — SIGNIFICANT CHANGE UP (ref 96–108)
CHLORIDE SERPL-SCNC: 108 MMOL/L — SIGNIFICANT CHANGE UP (ref 96–108)
CHLORIDE SERPL-SCNC: 108 MMOL/L — SIGNIFICANT CHANGE UP (ref 96–108)
CO2 SERPL-SCNC: 23 MMOL/L — SIGNIFICANT CHANGE UP (ref 22–31)
CO2 SERPL-SCNC: 23 MMOL/L — SIGNIFICANT CHANGE UP (ref 22–31)
CO2 SERPL-SCNC: 26 MMOL/L — SIGNIFICANT CHANGE UP (ref 22–31)
COVID-19 SPIKE DOMAIN AB INTERP: POSITIVE
COVID-19 SPIKE DOMAIN ANTIBODY RESULT: >250 U/ML — HIGH
CREAT SERPL-MCNC: 0.76 MG/DL — SIGNIFICANT CHANGE UP (ref 0.5–1.3)
CREAT SERPL-MCNC: 0.78 MG/DL — SIGNIFICANT CHANGE UP (ref 0.5–1.3)
CREAT SERPL-MCNC: 1.02 MG/DL — SIGNIFICANT CHANGE UP (ref 0.5–1.3)
EOSINOPHIL # BLD AUTO: 0.01 K/UL — SIGNIFICANT CHANGE UP (ref 0–0.5)
EOSINOPHIL NFR BLD AUTO: 0.1 % — SIGNIFICANT CHANGE UP (ref 0–6)
GAS PNL BLDA: SIGNIFICANT CHANGE UP
GLUCOSE BLDC GLUCOMTR-MCNC: 130 MG/DL — HIGH (ref 70–99)
GLUCOSE BLDC GLUCOMTR-MCNC: 136 MG/DL — HIGH (ref 70–99)
GLUCOSE BLDC GLUCOMTR-MCNC: 92 MG/DL — SIGNIFICANT CHANGE UP (ref 70–99)
GLUCOSE SERPL-MCNC: 169 MG/DL — HIGH (ref 70–99)
GLUCOSE SERPL-MCNC: 172 MG/DL — HIGH (ref 70–99)
GLUCOSE SERPL-MCNC: 98 MG/DL — SIGNIFICANT CHANGE UP (ref 70–99)
HCT VFR BLD CALC: 35.2 % — SIGNIFICANT CHANGE UP (ref 34.5–45)
HCT VFR BLD CALC: 35.8 % — SIGNIFICANT CHANGE UP (ref 34.5–45)
HCT VFR BLD CALC: 42.3 % — SIGNIFICANT CHANGE UP (ref 34.5–45)
HGB BLD-MCNC: 11.5 G/DL — SIGNIFICANT CHANGE UP (ref 11.5–15.5)
HGB BLD-MCNC: 11.6 G/DL — SIGNIFICANT CHANGE UP (ref 11.5–15.5)
HGB BLD-MCNC: 13.4 G/DL — SIGNIFICANT CHANGE UP (ref 11.5–15.5)
IMM GRANULOCYTES NFR BLD AUTO: 1.3 % — SIGNIFICANT CHANGE UP (ref 0–1.5)
INR BLD: 0.97 — SIGNIFICANT CHANGE UP (ref 0.88–1.16)
INR BLD: 1.06 — SIGNIFICANT CHANGE UP (ref 0.88–1.16)
INR BLD: 1.13 — SIGNIFICANT CHANGE UP (ref 0.88–1.16)
LYMPHOCYTES # BLD AUTO: 0.8 K/UL — LOW (ref 1–3.3)
LYMPHOCYTES # BLD AUTO: 4.9 % — LOW (ref 13–44)
MAGNESIUM SERPL-MCNC: 1.9 MG/DL — SIGNIFICANT CHANGE UP (ref 1.6–2.6)
MAGNESIUM SERPL-MCNC: 2.4 MG/DL — SIGNIFICANT CHANGE UP (ref 1.6–2.6)
MAGNESIUM SERPL-MCNC: 2.5 MG/DL — SIGNIFICANT CHANGE UP (ref 1.6–2.6)
MAGNESIUM SERPL-MCNC: 2.9 MG/DL — HIGH (ref 1.6–2.6)
MCHC RBC-ENTMCNC: 31.7 GM/DL — LOW (ref 32–36)
MCHC RBC-ENTMCNC: 32.1 GM/DL — SIGNIFICANT CHANGE UP (ref 32–36)
MCHC RBC-ENTMCNC: 33 GM/DL — SIGNIFICANT CHANGE UP (ref 32–36)
MCHC RBC-ENTMCNC: 33.2 PG — SIGNIFICANT CHANGE UP (ref 27–34)
MCHC RBC-ENTMCNC: 33.5 PG — SIGNIFICANT CHANGE UP (ref 27–34)
MCHC RBC-ENTMCNC: 34 PG — SIGNIFICANT CHANGE UP (ref 27–34)
MCV RBC AUTO: 103.2 FL — HIGH (ref 80–100)
MCV RBC AUTO: 103.5 FL — HIGH (ref 80–100)
MCV RBC AUTO: 105.8 FL — HIGH (ref 80–100)
MONOCYTES # BLD AUTO: 0.65 K/UL — SIGNIFICANT CHANGE UP (ref 0–0.9)
MONOCYTES NFR BLD AUTO: 3.9 % — SIGNIFICANT CHANGE UP (ref 2–14)
NEUTROPHILS # BLD AUTO: 14.77 K/UL — HIGH (ref 1.8–7.4)
NEUTROPHILS NFR BLD AUTO: 89.6 % — HIGH (ref 43–77)
NRBC # BLD: 0 /100 WBCS — SIGNIFICANT CHANGE UP (ref 0–0)
PHOSPHATE SERPL-MCNC: 2.7 MG/DL — SIGNIFICANT CHANGE UP (ref 2.5–4.5)
PHOSPHATE SERPL-MCNC: 3.6 MG/DL — SIGNIFICANT CHANGE UP (ref 2.5–4.5)
PLATELET # BLD AUTO: 122 K/UL — LOW (ref 150–400)
PLATELET # BLD AUTO: 140 K/UL — LOW (ref 150–400)
PLATELET # BLD AUTO: 189 K/UL — SIGNIFICANT CHANGE UP (ref 150–400)
POTASSIUM SERPL-MCNC: 3.8 MMOL/L — SIGNIFICANT CHANGE UP (ref 3.5–5.3)
POTASSIUM SERPL-MCNC: 4 MMOL/L — SIGNIFICANT CHANGE UP (ref 3.5–5.3)
POTASSIUM SERPL-MCNC: 4.4 MMOL/L — SIGNIFICANT CHANGE UP (ref 3.5–5.3)
POTASSIUM SERPL-MCNC: 4.6 MMOL/L — SIGNIFICANT CHANGE UP (ref 3.5–5.3)
POTASSIUM SERPL-MCNC: SIGNIFICANT CHANGE UP (ref 3.5–5.3)
POTASSIUM SERPL-SCNC: 3.8 MMOL/L — SIGNIFICANT CHANGE UP (ref 3.5–5.3)
POTASSIUM SERPL-SCNC: 4 MMOL/L — SIGNIFICANT CHANGE UP (ref 3.5–5.3)
POTASSIUM SERPL-SCNC: 4.4 MMOL/L — SIGNIFICANT CHANGE UP (ref 3.5–5.3)
POTASSIUM SERPL-SCNC: 4.6 MMOL/L — SIGNIFICANT CHANGE UP (ref 3.5–5.3)
POTASSIUM SERPL-SCNC: SIGNIFICANT CHANGE UP (ref 3.5–5.3)
PROT SERPL-MCNC: 5.2 G/DL — LOW (ref 6–8.3)
PROTHROM AB SERPL-ACNC: 11.7 SEC — SIGNIFICANT CHANGE UP (ref 10.6–13.6)
PROTHROM AB SERPL-ACNC: 12.7 SEC — SIGNIFICANT CHANGE UP (ref 10.6–13.6)
PROTHROM AB SERPL-ACNC: 13.5 SEC — SIGNIFICANT CHANGE UP (ref 10.6–13.6)
RBC # BLD: 3.41 M/UL — LOW (ref 3.8–5.2)
RBC # BLD: 3.46 M/UL — LOW (ref 3.8–5.2)
RBC # BLD: 4 M/UL — SIGNIFICANT CHANGE UP (ref 3.8–5.2)
RBC # FLD: 12.9 % — SIGNIFICANT CHANGE UP (ref 10.3–14.5)
RBC # FLD: 13.1 % — SIGNIFICANT CHANGE UP (ref 10.3–14.5)
RBC # FLD: 13.2 % — SIGNIFICANT CHANGE UP (ref 10.3–14.5)
RH IG SCN BLD-IMP: POSITIVE — SIGNIFICANT CHANGE UP
SARS-COV-2 IGG+IGM SERPL QL IA: >250 U/ML — HIGH
SARS-COV-2 IGG+IGM SERPL QL IA: POSITIVE
SODIUM SERPL-SCNC: 142 MMOL/L — SIGNIFICANT CHANGE UP (ref 135–145)
SODIUM SERPL-SCNC: 142 MMOL/L — SIGNIFICANT CHANGE UP (ref 135–145)
SODIUM SERPL-SCNC: 143 MMOL/L — SIGNIFICANT CHANGE UP (ref 135–145)
WBC # BLD: 15.25 K/UL — HIGH (ref 3.8–10.5)
WBC # BLD: 16.48 K/UL — HIGH (ref 3.8–10.5)
WBC # BLD: 7.07 K/UL — SIGNIFICANT CHANGE UP (ref 3.8–10.5)
WBC # FLD AUTO: 15.25 K/UL — HIGH (ref 3.8–10.5)
WBC # FLD AUTO: 16.48 K/UL — HIGH (ref 3.8–10.5)
WBC # FLD AUTO: 7.07 K/UL — SIGNIFICANT CHANGE UP (ref 3.8–10.5)

## 2021-03-31 PROCEDURE — 99292 CRITICAL CARE ADDL 30 MIN: CPT

## 2021-03-31 PROCEDURE — 71045 X-RAY EXAM CHEST 1 VIEW: CPT | Mod: 26

## 2021-03-31 PROCEDURE — 33820 REPAIR PDA BY LIGATION: CPT | Mod: AS

## 2021-03-31 PROCEDURE — 99291 CRITICAL CARE FIRST HOUR: CPT

## 2021-03-31 PROCEDURE — 88305 TISSUE EXAM BY PATHOLOGIST: CPT | Mod: 26

## 2021-03-31 PROCEDURE — 33426 REPAIR OF MITRAL VALVE: CPT | Mod: AS

## 2021-03-31 PROCEDURE — 33426 REPAIR OF MITRAL VALVE: CPT

## 2021-03-31 RX ORDER — LANOLIN ALCOHOL/MO/W.PET/CERES
5 CREAM (GRAM) TOPICAL AT BEDTIME
Refills: 0 | Status: DISCONTINUED | OUTPATIENT
Start: 2021-03-31 | End: 2021-04-04

## 2021-03-31 RX ORDER — ASPIRIN/CALCIUM CARB/MAGNESIUM 324 MG
81 TABLET ORAL DAILY
Refills: 0 | Status: DISCONTINUED | OUTPATIENT
Start: 2021-03-31 | End: 2021-04-04

## 2021-03-31 RX ORDER — POLYETHYLENE GLYCOL 3350 17 G/17G
17 POWDER, FOR SOLUTION ORAL DAILY
Refills: 0 | Status: DISCONTINUED | OUTPATIENT
Start: 2021-03-31 | End: 2021-04-04

## 2021-03-31 RX ORDER — LIDOCAINE 4 G/100G
1 CREAM TOPICAL ONCE
Refills: 0 | Status: COMPLETED | OUTPATIENT
Start: 2021-03-31 | End: 2021-03-31

## 2021-03-31 RX ORDER — INSULIN HUMAN 100 [IU]/ML
1 INJECTION, SOLUTION SUBCUTANEOUS
Qty: 100 | Refills: 0 | Status: ACTIVE | OUTPATIENT
Start: 2021-03-31 | End: 2022-02-27

## 2021-03-31 RX ORDER — HEPARIN SODIUM 5000 [USP'U]/ML
5000 INJECTION INTRAVENOUS; SUBCUTANEOUS EVERY 8 HOURS
Refills: 0 | Status: DISCONTINUED | OUTPATIENT
Start: 2021-03-31 | End: 2021-04-04

## 2021-03-31 RX ORDER — CHLORHEXIDINE GLUCONATE 213 G/1000ML
15 SOLUTION TOPICAL EVERY 12 HOURS
Refills: 0 | Status: DISCONTINUED | OUTPATIENT
Start: 2021-03-31 | End: 2021-03-31

## 2021-03-31 RX ORDER — CALCIUM GLUCONATE 100 MG/ML
2 VIAL (ML) INTRAVENOUS ONCE
Refills: 0 | Status: COMPLETED | OUTPATIENT
Start: 2021-03-31 | End: 2021-03-31

## 2021-03-31 RX ORDER — FENTANYL CITRATE 50 UG/ML
25 INJECTION INTRAVENOUS ONCE
Refills: 0 | Status: DISCONTINUED | OUTPATIENT
Start: 2021-03-31 | End: 2021-03-31

## 2021-03-31 RX ORDER — ACETAMINOPHEN 500 MG
1000 TABLET ORAL ONCE
Refills: 0 | Status: COMPLETED | OUTPATIENT
Start: 2021-03-31 | End: 2021-03-31

## 2021-03-31 RX ORDER — DEXTROSE 50 % IN WATER 50 %
50 SYRINGE (ML) INTRAVENOUS
Refills: 0 | Status: ACTIVE | OUTPATIENT
Start: 2021-03-31

## 2021-03-31 RX ORDER — CEFAZOLIN SODIUM 1 G
2000 VIAL (EA) INJECTION EVERY 8 HOURS
Refills: 0 | Status: COMPLETED | OUTPATIENT
Start: 2021-03-31 | End: 2021-04-02

## 2021-03-31 RX ORDER — CHLORHEXIDINE GLUCONATE 213 G/1000ML
1 SOLUTION TOPICAL
Refills: 0 | Status: DISCONTINUED | OUTPATIENT
Start: 2021-03-31 | End: 2021-04-04

## 2021-03-31 RX ORDER — AMIODARONE HYDROCHLORIDE 400 MG/1
150 TABLET ORAL ONCE
Refills: 0 | Status: COMPLETED | OUTPATIENT
Start: 2021-03-31 | End: 2021-03-31

## 2021-03-31 RX ORDER — KETOROLAC TROMETHAMINE 30 MG/ML
30 SYRINGE (ML) INJECTION ONCE
Refills: 0 | Status: DISCONTINUED | OUTPATIENT
Start: 2021-03-31 | End: 2021-04-01

## 2021-03-31 RX ORDER — DEXMEDETOMIDINE HYDROCHLORIDE IN 0.9% SODIUM CHLORIDE 4 UG/ML
0.5 INJECTION INTRAVENOUS
Qty: 400 | Refills: 0 | Status: ACTIVE | OUTPATIENT
Start: 2021-03-31 | End: 2022-02-27

## 2021-03-31 RX ORDER — DEXTROSE 50 % IN WATER 50 %
25 SYRINGE (ML) INTRAVENOUS
Refills: 0 | Status: ACTIVE | OUTPATIENT
Start: 2021-03-31

## 2021-03-31 RX ORDER — KETOROLAC TROMETHAMINE 30 MG/ML
15 SYRINGE (ML) INJECTION ONCE
Refills: 0 | Status: DISCONTINUED | OUTPATIENT
Start: 2021-03-31 | End: 2021-03-31

## 2021-03-31 RX ORDER — PANTOPRAZOLE SODIUM 20 MG/1
40 TABLET, DELAYED RELEASE ORAL ONCE
Refills: 0 | Status: DISCONTINUED | OUTPATIENT
Start: 2021-03-31 | End: 2021-04-01

## 2021-03-31 RX ORDER — SODIUM CHLORIDE 9 MG/ML
1000 INJECTION INTRAMUSCULAR; INTRAVENOUS; SUBCUTANEOUS
Refills: 0 | Status: ACTIVE | OUTPATIENT
Start: 2021-03-31 | End: 2022-02-27

## 2021-03-31 RX ORDER — BUPIVACAINE 13.3 MG/ML
20 INJECTION, SUSPENSION, LIPOSOMAL INFILTRATION ONCE
Refills: 0 | Status: ACTIVE | OUTPATIENT
Start: 2021-03-31

## 2021-03-31 RX ADMIN — Medication 12.5 MILLIGRAM(S): at 05:13

## 2021-03-31 RX ADMIN — CHLORHEXIDINE GLUCONATE 1 APPLICATION(S): 213 SOLUTION TOPICAL at 06:02

## 2021-03-31 RX ADMIN — SODIUM CHLORIDE 3 MILLILITER(S): 9 INJECTION INTRAMUSCULAR; INTRAVENOUS; SUBCUTANEOUS at 06:02

## 2021-03-31 RX ADMIN — HEPARIN SODIUM 5000 UNIT(S): 5000 INJECTION INTRAVENOUS; SUBCUTANEOUS at 06:02

## 2021-03-31 RX ADMIN — AMIODARONE HYDROCHLORIDE 150 MILLIGRAM(S): 400 TABLET ORAL at 22:50

## 2021-03-31 RX ADMIN — FENTANYL CITRATE 25 MICROGRAM(S): 50 INJECTION INTRAVENOUS at 14:30

## 2021-03-31 RX ADMIN — FENTANYL CITRATE 25 MICROGRAM(S): 50 INJECTION INTRAVENOUS at 15:00

## 2021-03-31 RX ADMIN — Medication 15 MILLIGRAM(S): at 16:26

## 2021-03-31 RX ADMIN — Medication 100 MILLIGRAM(S): at 19:26

## 2021-03-31 RX ADMIN — Medication 200 GRAM(S): at 22:49

## 2021-03-31 RX ADMIN — LIDOCAINE 1 PATCH: 4 CREAM TOPICAL at 18:31

## 2021-03-31 RX ADMIN — Medication 5 MILLIGRAM(S): at 21:35

## 2021-03-31 RX ADMIN — LIDOCAINE 1 PATCH: 4 CREAM TOPICAL at 16:53

## 2021-03-31 RX ADMIN — Medication 1000 MILLIGRAM(S): at 22:49

## 2021-03-31 RX ADMIN — Medication 15 MILLIGRAM(S): at 16:27

## 2021-03-31 RX ADMIN — HEPARIN SODIUM 5000 UNIT(S): 5000 INJECTION INTRAVENOUS; SUBCUTANEOUS at 21:35

## 2021-03-31 RX ADMIN — CHLORHEXIDINE GLUCONATE 10 MILLILITER(S): 213 SOLUTION TOPICAL at 06:01

## 2021-03-31 RX ADMIN — Medication 400 MILLIGRAM(S): at 21:53

## 2021-03-31 NOTE — BRIEF OPERATIVE NOTE - NSICDXBRIEFPROCEDURE_GEN_ALL_CORE_FT
PROCEDURES:  Replacement, mitral valve, repeat, minimally invasive, with SARAH 31-Mar-2021 12:14:24 Mini MV repair, PFO closure EF Dinaelys Berger

## 2021-03-31 NOTE — PROGRESS NOTE ADULT - SUBJECTIVE AND OBJECTIVE BOX
CTICU  CRITICAL  CARE  attending     Hand off received 					   Pertinent clinical, laboratory, radiographic, hemodynamic, echocardiographic, respiratory data, microbiologic data and chart were reviewed and analyzed frequently throughout the course of the day and night  Patient seen and examined with CTS/ SH attending at bedside      Pt is a 80y , Female, s/p MV repair and PFO closure today via min invasive right thoracotomy;     post op:    extubated  frequent PAC's requiring amiodarone bolus x 1  on O2 supplementation      HPI:    81 y/o Female with PMHx HTN, MVP, severe MR, and hypercalcemia, presented to Dr. Wilcox for surgical evaluation of severe MR. Pt endorses GRAMAJO after 2 flights of stairs, as well as palpitations and lower extremity edema. She denies CP, SOB at rest, orthopnea/PND, syncope, dizziness, fever, chills, cough, or recent illness. Pt reports good compliance with all medications including Aspirin 81mg daily, which she states she last took the morning of 3/29. TTE 11/2020 revealed EF 70%, mod-severe MR. Subsequent SARAH 12/9/2020 revealed severe MVP w/severe MR and flail segment of posterior leaflet.  In light of patient's risk factors, and CCS class 2 anginal/anginal-equivalent symptoms, pt is referred to [facility] for cardiac catheterization w/ possible intervention if clinically indicated to r/o CAD with plan for subsequent admission to [facility] for MVR.        , FAMILY HISTORY:  PAST MEDICAL & SURGICAL HISTORY:  HTN (hypertension)    HLD (hyperlipidemia)    Mitral regurgitation    Mitral prolapse      Patient is a 80y old  Female who presents with a chief complaint of Cardiac catheterization (31 Mar 2021 12:56)      14 system review was unremarkable    Vital signs, hemodynamic and respiratory parameters were reviewed from the bedside nursing flowsheet.  ICU Vital Signs Last 24 Hrs  T(C): 36.6 (31 Mar 2021 21:10), Max: 36.6 (31 Mar 2021 21:10)  T(F): 97.8 (31 Mar 2021 21:10), Max: 97.8 (31 Mar 2021 21:10)  HR: 63 (01 Apr 2021 00:00) (63 - 95)  BP: 94/53 (01 Apr 2021 00:00) (94/53 - 136/80)  BP(mean): 70 (01 Apr 2021 00:00) (70 - 102)  ABP: 106/45 (01 Apr 2021 00:00) (106/45 - 173/99)  ABP(mean): 66 (01 Apr 2021 00:00) (66 - 130)  RR: 16 (01 Apr 2021 00:00) (12 - 22)  SpO2: 97% (01 Apr 2021 00:00) (95% - 100%)    Adult Advanced Hemodynamics Last 24 Hrs  CVP(mm Hg): 4 (01 Apr 2021 00:00) (2 - 13)  CVP(cm H2O): --  CO: --  CI: --  PA: --  PA(mean): --  PCWP: --  SVR: --  SVRI: --  PVR: --  PVRI: --, ABG - ( 31 Mar 2021 21:03 )  pH, Arterial: 7.38  pH, Blood: x     /  pCO2: 41    /  pO2: 120   / HCO3: 24    / Base Excess: -1.2  /  SaO2: 98                Mode: AC/ CMV (Assist Control/ Continuous Mandatory Ventilation)  RR (machine): 12  TV (machine): 500  FiO2: 60  PEEP: 5  ITime: 1  MAP: 11  PIP: 25    Intake and output was reviewed and the fluid balance was calculated  Daily Height in cm: 167.64 (31 Mar 2021 05:05)    Daily   I&O's Summary    31 Mar 2021 07:01  -  01 Apr 2021 01:00  --------------------------------------------------------  IN: 659.8 mL / OUT: 1243 mL / NET: -583.2 mL        All lines and drain sites were assessed  [facility]      POCT Blood Glucose.: 130 mg/dL (31 Mar 2021 21:00)    No acute change in mental status  Auscultation of the chest reveals equal bs  Abdomen is soft  Extremities are warm and well perfused  Wounds appear clean and unremarkable  Antibiotics are periop    labs  CBC Full  -  ( 31 Mar 2021 15:49 )  WBC Count : 15.25 K/uL  RBC Count : 3.46 M/uL  Hemoglobin : 11.5 g/dL  Hematocrit : 35.8 %  Platelet Count - Automated : 140 K/uL  Mean Cell Volume : 103.5 fl  Mean Cell Hemoglobin : 33.2 pg  Mean Cell Hemoglobin Concentration : 32.1 gm/dL  Auto Neutrophil # : x  Auto Lymphocyte # : x  Auto Monocyte # : x  Auto Eosinophil # : x  Auto Basophil # : x  Auto Neutrophil % : x  Auto Lymphocyte % : x  Auto Monocyte % : x  Auto Eosinophil % : x  Auto Basophil % : x    03-31    x   |  x   |  x   ----------------------------<  x   4.6   |  x   |  x     Ca    7.9<L>      31 Mar 2021 15:49  Phos  3.6     03-31  Mg     2.4     03-31    TPro  5.2<L>  /  Alb  2.9<L>  /  TBili  0.3  /  DBili  x   /  AST  42<H>  /  ALT  13  /  AlkPhos  54  03-31    PT/INR - ( 31 Mar 2021 15:49 )   PT: 12.7 sec;   INR: 1.06          PTT - ( 31 Mar 2021 15:49 )  PTT:43.8 sec  The current medications were reviewed   MEDICATIONS  (STANDING):  aspirin enteric coated 81 milliGRAM(s) Oral daily  BUpivacaine liposome 1.3% Injectable (no eMAR) 20 milliLiter(s) Local Injection once  ceFAZolin   IVPB 2000 milliGRAM(s) IV Intermittent every 8 hours  chlorhexidine 2% Cloths 1 Application(s) Topical <User Schedule>  dexMEDEtomidine Infusion 0.5 MICROgram(s)/kG/Hr (6.88 mL/Hr) IV Continuous <Continuous>  dextrose 50% Injectable 50 milliLiter(s) IV Push every 15 minutes  dextrose 50% Injectable 25 milliLiter(s) IV Push every 15 minutes  heparin   Injectable 5000 Unit(s) SubCutaneous every 8 hours  insulin regular Infusion 1 Unit(s)/Hr (1 mL/Hr) IV Continuous <Continuous>  melatonin 5 milliGRAM(s) Oral at bedtime  pantoprazole    Tablet 40 milliGRAM(s) Oral once  polyethylene glycol 3350 17 Gram(s) Oral daily  sodium chloride 0.9%. 1000 milliLiter(s) (10 mL/Hr) IV Continuous <Continuous>    MEDICATIONS  (PRN):  ketorolac   Injectable 30 milliGRAM(s) IV Push once PRN Moderate Pain (4 - 6)       PROBLEM LIST/ ASSESSMENT:  HEALTH ISSUES - PROBLEM Dx:      ,   Patient is a 80y old  Female who presents with a chief complaint of Cardiac catheterization (31 Mar 2021 12:56)     s/p MV repair and PFO closure      My plan includes :  close hemodynamic, ventilatory and drain monitoring and management per post op routine    Monitor for arrhythmias and monitor parameters for organ perfusion  monitor neurologic status  Head of the bed should remain elevated to 45 deg .   chest PT and IS will be encouraged  monitor adequacy of oxygenation and ventilation and attempt to wean oxygen  Nutritional goals will be met using po eventually , ensure adequate caloric intake and montior the same  Stress ulcer and VTE prophylaxis will be achieved    Glycemic control is satisfactory  Electrolytes have been repleted as necessary and wound care has been carried out. Pain control has been achieved.   agressive physical therapy and early mobility and ambulation goals will be met   The family was updated about the course and plan  CRITICAL CARE TIME SPENT in evaluation and management, reassessments, review and interpretation of labs and x-rays, ventilator and hemodynamic management, formulating a plan and coordinating care: __30___ MIN.  Time does not include procedural time.  CTICU ATTENDING     					    Jeovany Paredes MD

## 2021-03-31 NOTE — PRE-OP CHECKLIST - SELECT TESTS ORDERED
BMP/CBC/PT/PTT/INR/Type and Screen/Urinalysis/EKG/CXR/POCT Blood Glucose/COVID-19 92/BMP/CBC/PT/PTT/INR/Type and Screen/Urinalysis/EKG/CXR/POCT Blood Glucose/COVID-19

## 2021-04-01 LAB
ALBUMIN SERPL ELPH-MCNC: 3 G/DL — LOW (ref 3.3–5)
ALP SERPL-CCNC: 48 U/L — SIGNIFICANT CHANGE UP (ref 40–120)
ALT FLD-CCNC: 10 U/L — SIGNIFICANT CHANGE UP (ref 10–45)
ANION GAP SERPL CALC-SCNC: 8 MMOL/L — SIGNIFICANT CHANGE UP (ref 5–17)
ANION GAP SERPL CALC-SCNC: 9 MMOL/L — SIGNIFICANT CHANGE UP (ref 5–17)
APTT BLD: 27.8 SEC — SIGNIFICANT CHANGE UP (ref 27.5–35.5)
AST SERPL-CCNC: 40 U/L — SIGNIFICANT CHANGE UP (ref 10–40)
BILIRUB SERPL-MCNC: 0.2 MG/DL — SIGNIFICANT CHANGE UP (ref 0.2–1.2)
BUN SERPL-MCNC: 13 MG/DL — SIGNIFICANT CHANGE UP (ref 7–23)
BUN SERPL-MCNC: 17 MG/DL — SIGNIFICANT CHANGE UP (ref 7–23)
CALCIUM SERPL-MCNC: 8.4 MG/DL — SIGNIFICANT CHANGE UP (ref 8.4–10.5)
CALCIUM SERPL-MCNC: 8.6 MG/DL — SIGNIFICANT CHANGE UP (ref 8.4–10.5)
CHLORIDE SERPL-SCNC: 103 MMOL/L — SIGNIFICANT CHANGE UP (ref 96–108)
CHLORIDE SERPL-SCNC: 106 MMOL/L — SIGNIFICANT CHANGE UP (ref 96–108)
CO2 SERPL-SCNC: 24 MMOL/L — SIGNIFICANT CHANGE UP (ref 22–31)
CO2 SERPL-SCNC: 26 MMOL/L — SIGNIFICANT CHANGE UP (ref 22–31)
CREAT SERPL-MCNC: 0.94 MG/DL — SIGNIFICANT CHANGE UP (ref 0.5–1.3)
CREAT SERPL-MCNC: 0.98 MG/DL — SIGNIFICANT CHANGE UP (ref 0.5–1.3)
GAS PNL BLDA: SIGNIFICANT CHANGE UP
GLUCOSE SERPL-MCNC: 126 MG/DL — HIGH (ref 70–99)
GLUCOSE SERPL-MCNC: 130 MG/DL — HIGH (ref 70–99)
HCT VFR BLD CALC: 29.6 % — LOW (ref 34.5–45)
HCT VFR BLD CALC: 32.7 % — LOW (ref 34.5–45)
HGB BLD-MCNC: 10.4 G/DL — LOW (ref 11.5–15.5)
HGB BLD-MCNC: 9.4 G/DL — LOW (ref 11.5–15.5)
INR BLD: 1.02 — SIGNIFICANT CHANGE UP (ref 0.88–1.16)
MAGNESIUM SERPL-MCNC: 2.4 MG/DL — SIGNIFICANT CHANGE UP (ref 1.6–2.6)
MAGNESIUM SERPL-MCNC: 2.4 MG/DL — SIGNIFICANT CHANGE UP (ref 1.6–2.6)
MCHC RBC-ENTMCNC: 31.8 GM/DL — LOW (ref 32–36)
MCHC RBC-ENTMCNC: 31.8 GM/DL — LOW (ref 32–36)
MCHC RBC-ENTMCNC: 33.8 PG — SIGNIFICANT CHANGE UP (ref 27–34)
MCHC RBC-ENTMCNC: 33.8 PG — SIGNIFICANT CHANGE UP (ref 27–34)
MCV RBC AUTO: 106.2 FL — HIGH (ref 80–100)
MCV RBC AUTO: 106.5 FL — HIGH (ref 80–100)
NRBC # BLD: 0 /100 WBCS — SIGNIFICANT CHANGE UP (ref 0–0)
NRBC # BLD: 0 /100 WBCS — SIGNIFICANT CHANGE UP (ref 0–0)
PHOSPHATE SERPL-MCNC: 5.1 MG/DL — HIGH (ref 2.5–4.5)
PLATELET # BLD AUTO: 134 K/UL — LOW (ref 150–400)
PLATELET # BLD AUTO: 140 K/UL — LOW (ref 150–400)
POTASSIUM SERPL-MCNC: 4.2 MMOL/L — SIGNIFICANT CHANGE UP (ref 3.5–5.3)
POTASSIUM SERPL-MCNC: 4.5 MMOL/L — SIGNIFICANT CHANGE UP (ref 3.5–5.3)
POTASSIUM SERPL-SCNC: 4.2 MMOL/L — SIGNIFICANT CHANGE UP (ref 3.5–5.3)
POTASSIUM SERPL-SCNC: 4.5 MMOL/L — SIGNIFICANT CHANGE UP (ref 3.5–5.3)
PROT SERPL-MCNC: 5.1 G/DL — LOW (ref 6–8.3)
PROTHROM AB SERPL-ACNC: 12.2 SEC — SIGNIFICANT CHANGE UP (ref 10.6–13.6)
RBC # BLD: 2.78 M/UL — LOW (ref 3.8–5.2)
RBC # BLD: 3.08 M/UL — LOW (ref 3.8–5.2)
RBC # FLD: 13.3 % — SIGNIFICANT CHANGE UP (ref 10.3–14.5)
RBC # FLD: 13.3 % — SIGNIFICANT CHANGE UP (ref 10.3–14.5)
SODIUM SERPL-SCNC: 136 MMOL/L — SIGNIFICANT CHANGE UP (ref 135–145)
SODIUM SERPL-SCNC: 140 MMOL/L — SIGNIFICANT CHANGE UP (ref 135–145)
WBC # BLD: 15.84 K/UL — HIGH (ref 3.8–10.5)
WBC # BLD: 16.04 K/UL — HIGH (ref 3.8–10.5)
WBC # FLD AUTO: 15.84 K/UL — HIGH (ref 3.8–10.5)
WBC # FLD AUTO: 16.04 K/UL — HIGH (ref 3.8–10.5)

## 2021-04-01 PROCEDURE — 71045 X-RAY EXAM CHEST 1 VIEW: CPT | Mod: 26

## 2021-04-01 PROCEDURE — 99292 CRITICAL CARE ADDL 30 MIN: CPT

## 2021-04-01 PROCEDURE — 71045 X-RAY EXAM CHEST 1 VIEW: CPT | Mod: 26,77

## 2021-04-01 PROCEDURE — 99291 CRITICAL CARE FIRST HOUR: CPT

## 2021-04-01 RX ORDER — ACETAMINOPHEN 500 MG
650 TABLET ORAL EVERY 6 HOURS
Refills: 0 | Status: DISCONTINUED | OUTPATIENT
Start: 2021-04-01 | End: 2021-04-04

## 2021-04-01 RX ORDER — MORPHINE SULFATE 50 MG/1
2 CAPSULE, EXTENDED RELEASE ORAL ONCE
Refills: 0 | Status: DISCONTINUED | OUTPATIENT
Start: 2021-04-01 | End: 2021-04-01

## 2021-04-01 RX ORDER — PANTOPRAZOLE SODIUM 20 MG/1
40 TABLET, DELAYED RELEASE ORAL
Refills: 0 | Status: DISCONTINUED | OUTPATIENT
Start: 2021-04-01 | End: 2021-04-04

## 2021-04-01 RX ORDER — SODIUM CHLORIDE 9 MG/ML
3 INJECTION INTRAMUSCULAR; INTRAVENOUS; SUBCUTANEOUS EVERY 8 HOURS
Refills: 0 | Status: DISCONTINUED | OUTPATIENT
Start: 2021-04-01 | End: 2021-04-04

## 2021-04-01 RX ORDER — ACETAMINOPHEN 500 MG
1000 TABLET ORAL ONCE
Refills: 0 | Status: COMPLETED | OUTPATIENT
Start: 2021-04-01 | End: 2021-04-01

## 2021-04-01 RX ORDER — ALBUMIN HUMAN 25 %
250 VIAL (ML) INTRAVENOUS ONCE
Refills: 0 | Status: COMPLETED | OUTPATIENT
Start: 2021-04-01 | End: 2021-04-01

## 2021-04-01 RX ORDER — FENTANYL CITRATE 50 UG/ML
12.5 INJECTION INTRAVENOUS ONCE
Refills: 0 | Status: DISCONTINUED | OUTPATIENT
Start: 2021-04-01 | End: 2021-04-01

## 2021-04-01 RX ADMIN — PANTOPRAZOLE SODIUM 40 MILLIGRAM(S): 20 TABLET, DELAYED RELEASE ORAL at 12:18

## 2021-04-01 RX ADMIN — FENTANYL CITRATE 12.5 MICROGRAM(S): 50 INJECTION INTRAVENOUS at 03:00

## 2021-04-01 RX ADMIN — SODIUM CHLORIDE 3 MILLILITER(S): 9 INJECTION INTRAMUSCULAR; INTRAVENOUS; SUBCUTANEOUS at 22:16

## 2021-04-01 RX ADMIN — POLYETHYLENE GLYCOL 3350 17 GRAM(S): 17 POWDER, FOR SOLUTION ORAL at 11:37

## 2021-04-01 RX ADMIN — CHLORHEXIDINE GLUCONATE 1 APPLICATION(S): 213 SOLUTION TOPICAL at 05:05

## 2021-04-01 RX ADMIN — MORPHINE SULFATE 2 MILLIGRAM(S): 50 CAPSULE, EXTENDED RELEASE ORAL at 06:51

## 2021-04-01 RX ADMIN — MORPHINE SULFATE 2 MILLIGRAM(S): 50 CAPSULE, EXTENDED RELEASE ORAL at 20:47

## 2021-04-01 RX ADMIN — SODIUM CHLORIDE 3 MILLILITER(S): 9 INJECTION INTRAMUSCULAR; INTRAVENOUS; SUBCUTANEOUS at 15:32

## 2021-04-01 RX ADMIN — Medication 100 MILLIGRAM(S): at 03:47

## 2021-04-01 RX ADMIN — HEPARIN SODIUM 5000 UNIT(S): 5000 INJECTION INTRAVENOUS; SUBCUTANEOUS at 15:34

## 2021-04-01 RX ADMIN — Medication 1000 MILLIGRAM(S): at 10:45

## 2021-04-01 RX ADMIN — HEPARIN SODIUM 5000 UNIT(S): 5000 INJECTION INTRAVENOUS; SUBCUTANEOUS at 22:15

## 2021-04-01 RX ADMIN — Medication 100 MILLIGRAM(S): at 11:37

## 2021-04-01 RX ADMIN — MORPHINE SULFATE 2 MILLIGRAM(S): 50 CAPSULE, EXTENDED RELEASE ORAL at 05:05

## 2021-04-01 RX ADMIN — MORPHINE SULFATE 2 MILLIGRAM(S): 50 CAPSULE, EXTENDED RELEASE ORAL at 16:30

## 2021-04-01 RX ADMIN — MORPHINE SULFATE 2 MILLIGRAM(S): 50 CAPSULE, EXTENDED RELEASE ORAL at 04:04

## 2021-04-01 RX ADMIN — MORPHINE SULFATE 2 MILLIGRAM(S): 50 CAPSULE, EXTENDED RELEASE ORAL at 16:45

## 2021-04-01 RX ADMIN — FENTANYL CITRATE 12.5 MICROGRAM(S): 50 INJECTION INTRAVENOUS at 06:05

## 2021-04-01 RX ADMIN — Medication 400 MILLIGRAM(S): at 10:30

## 2021-04-01 RX ADMIN — LIDOCAINE 1 PATCH: 4 CREAM TOPICAL at 03:28

## 2021-04-01 RX ADMIN — Medication 125 MILLILITER(S): at 12:41

## 2021-04-01 RX ADMIN — MORPHINE SULFATE 2 MILLIGRAM(S): 50 CAPSULE, EXTENDED RELEASE ORAL at 21:17

## 2021-04-01 RX ADMIN — Medication 81 MILLIGRAM(S): at 11:37

## 2021-04-01 RX ADMIN — HEPARIN SODIUM 5000 UNIT(S): 5000 INJECTION INTRAVENOUS; SUBCUTANEOUS at 06:51

## 2021-04-01 RX ADMIN — Medication 100 MILLIGRAM(S): at 20:46

## 2021-04-01 RX ADMIN — MORPHINE SULFATE 2 MILLIGRAM(S): 50 CAPSULE, EXTENDED RELEASE ORAL at 06:50

## 2021-04-01 NOTE — PHYSICAL THERAPY INITIAL EVALUATION ADULT - PERTINENT HX OF CURRENT PROBLEM, REHAB EVAL
81 y/o Female with PMHx HTN, MVP, severe MR, and hypercalcemia, presented to Dr. Wilcox for surgical evaluation of severe MR.

## 2021-04-01 NOTE — PHYSICAL THERAPY INITIAL EVALUATION ADULT - GENERAL OBSERVATIONS, REHAB EVAL
Pt received seated out of bed no acute distress +tele +A-line +fam +SCDs +temp PPM, cleared for PT by STEFANI Ratliff, agreeable to PT Eval. Left as found all lines intact +call STEFANI otero present/aware. FIM 1

## 2021-04-01 NOTE — PHYSICAL THERAPY INITIAL EVALUATION ADULT - GAIT DISTANCE, PT EVAL
30 ft x2 with 5min seated rest break 2/2 increasing dizziness and pain; chair follow on 2nd bout of amb back to room

## 2021-04-02 LAB
ALBUMIN SERPL ELPH-MCNC: 3.1 G/DL — LOW (ref 3.3–5)
ALP SERPL-CCNC: 47 U/L — SIGNIFICANT CHANGE UP (ref 40–120)
ALT FLD-CCNC: 6 U/L — LOW (ref 10–45)
ANION GAP SERPL CALC-SCNC: 8 MMOL/L — SIGNIFICANT CHANGE UP (ref 5–17)
APTT BLD: 27.2 SEC — LOW (ref 27.5–35.5)
AST SERPL-CCNC: 24 U/L — SIGNIFICANT CHANGE UP (ref 10–40)
BILIRUB SERPL-MCNC: 0.4 MG/DL — SIGNIFICANT CHANGE UP (ref 0.2–1.2)
BUN SERPL-MCNC: 13 MG/DL — SIGNIFICANT CHANGE UP (ref 7–23)
CALCIUM SERPL-MCNC: 8.4 MG/DL — SIGNIFICANT CHANGE UP (ref 8.4–10.5)
CHLORIDE SERPL-SCNC: 103 MMOL/L — SIGNIFICANT CHANGE UP (ref 96–108)
CO2 SERPL-SCNC: 26 MMOL/L — SIGNIFICANT CHANGE UP (ref 22–31)
CREAT SERPL-MCNC: 0.8 MG/DL — SIGNIFICANT CHANGE UP (ref 0.5–1.3)
GLUCOSE SERPL-MCNC: 129 MG/DL — HIGH (ref 70–99)
HCT VFR BLD CALC: 28.4 % — LOW (ref 34.5–45)
HGB BLD-MCNC: 8.9 G/DL — LOW (ref 11.5–15.5)
INR BLD: 1.06 — SIGNIFICANT CHANGE UP (ref 0.88–1.16)
MAGNESIUM SERPL-MCNC: 2.1 MG/DL — SIGNIFICANT CHANGE UP (ref 1.6–2.6)
MCHC RBC-ENTMCNC: 31.3 GM/DL — LOW (ref 32–36)
MCHC RBC-ENTMCNC: 33.7 PG — SIGNIFICANT CHANGE UP (ref 27–34)
MCV RBC AUTO: 107.6 FL — HIGH (ref 80–100)
NRBC # BLD: 0 /100 WBCS — SIGNIFICANT CHANGE UP (ref 0–0)
PHOSPHATE SERPL-MCNC: 2.6 MG/DL — SIGNIFICANT CHANGE UP (ref 2.5–4.5)
PLATELET # BLD AUTO: 115 K/UL — LOW (ref 150–400)
POTASSIUM SERPL-MCNC: 4.2 MMOL/L — SIGNIFICANT CHANGE UP (ref 3.5–5.3)
POTASSIUM SERPL-SCNC: 4.2 MMOL/L — SIGNIFICANT CHANGE UP (ref 3.5–5.3)
PROT SERPL-MCNC: 5.1 G/DL — LOW (ref 6–8.3)
PROTHROM AB SERPL-ACNC: 12.7 SEC — SIGNIFICANT CHANGE UP (ref 10.6–13.6)
RBC # BLD: 2.64 M/UL — LOW (ref 3.8–5.2)
RBC # FLD: 13.4 % — SIGNIFICANT CHANGE UP (ref 10.3–14.5)
SODIUM SERPL-SCNC: 137 MMOL/L — SIGNIFICANT CHANGE UP (ref 135–145)
SURGICAL PATHOLOGY STUDY: SIGNIFICANT CHANGE UP
WBC # BLD: 15.18 K/UL — HIGH (ref 3.8–10.5)
WBC # FLD AUTO: 15.18 K/UL — HIGH (ref 3.8–10.5)

## 2021-04-02 PROCEDURE — 99292 CRITICAL CARE ADDL 30 MIN: CPT

## 2021-04-02 PROCEDURE — 99291 CRITICAL CARE FIRST HOUR: CPT

## 2021-04-02 PROCEDURE — 71045 X-RAY EXAM CHEST 1 VIEW: CPT | Mod: 26,76

## 2021-04-02 PROCEDURE — 99232 SBSQ HOSP IP/OBS MODERATE 35: CPT

## 2021-04-02 RX ORDER — BENZOCAINE AND MENTHOL 5; 1 G/100ML; G/100ML
1 LIQUID ORAL
Refills: 0 | Status: DISCONTINUED | OUTPATIENT
Start: 2021-04-02 | End: 2021-04-02

## 2021-04-02 RX ORDER — MORPHINE SULFATE 50 MG/1
2 CAPSULE, EXTENDED RELEASE ORAL ONCE
Refills: 0 | Status: DISCONTINUED | OUTPATIENT
Start: 2021-04-02 | End: 2021-04-02

## 2021-04-02 RX ORDER — METOPROLOL TARTRATE 50 MG
12.5 TABLET ORAL EVERY 12 HOURS
Refills: 0 | Status: DISCONTINUED | OUTPATIENT
Start: 2021-04-02 | End: 2021-04-04

## 2021-04-02 RX ORDER — LIDOCAINE 4 G/100G
1 CREAM TOPICAL DAILY
Refills: 0 | Status: DISCONTINUED | OUTPATIENT
Start: 2021-04-02 | End: 2021-04-04

## 2021-04-02 RX ORDER — IPRATROPIUM/ALBUTEROL SULFATE 18-103MCG
3 AEROSOL WITH ADAPTER (GRAM) INHALATION ONCE
Refills: 0 | Status: COMPLETED | OUTPATIENT
Start: 2021-04-02 | End: 2021-04-03

## 2021-04-02 RX ORDER — BENZOCAINE AND MENTHOL 5; 1 G/100ML; G/100ML
1 LIQUID ORAL
Refills: 0 | Status: DISCONTINUED | OUTPATIENT
Start: 2021-04-02 | End: 2021-04-04

## 2021-04-02 RX ORDER — FUROSEMIDE 40 MG
20 TABLET ORAL ONCE
Refills: 0 | Status: COMPLETED | OUTPATIENT
Start: 2021-04-02 | End: 2021-04-02

## 2021-04-02 RX ADMIN — Medication 650 MILLIGRAM(S): at 20:30

## 2021-04-02 RX ADMIN — Medication 5 MILLIGRAM(S): at 21:22

## 2021-04-02 RX ADMIN — HEPARIN SODIUM 5000 UNIT(S): 5000 INJECTION INTRAVENOUS; SUBCUTANEOUS at 06:40

## 2021-04-02 RX ADMIN — Medication 650 MILLIGRAM(S): at 20:02

## 2021-04-02 RX ADMIN — Medication 20 MILLIGRAM(S): at 05:20

## 2021-04-02 RX ADMIN — MORPHINE SULFATE 2 MILLIGRAM(S): 50 CAPSULE, EXTENDED RELEASE ORAL at 06:30

## 2021-04-02 RX ADMIN — Medication 100 MILLIGRAM(S): at 04:00

## 2021-04-02 RX ADMIN — POLYETHYLENE GLYCOL 3350 17 GRAM(S): 17 POWDER, FOR SOLUTION ORAL at 12:50

## 2021-04-02 RX ADMIN — MORPHINE SULFATE 2 MILLIGRAM(S): 50 CAPSULE, EXTENDED RELEASE ORAL at 06:00

## 2021-04-02 RX ADMIN — HEPARIN SODIUM 5000 UNIT(S): 5000 INJECTION INTRAVENOUS; SUBCUTANEOUS at 21:22

## 2021-04-02 RX ADMIN — Medication 650 MILLIGRAM(S): at 13:00

## 2021-04-02 RX ADMIN — SODIUM CHLORIDE 3 MILLILITER(S): 9 INJECTION INTRAMUSCULAR; INTRAVENOUS; SUBCUTANEOUS at 14:09

## 2021-04-02 RX ADMIN — Medication 81 MILLIGRAM(S): at 12:50

## 2021-04-02 RX ADMIN — SODIUM CHLORIDE 3 MILLILITER(S): 9 INJECTION INTRAMUSCULAR; INTRAVENOUS; SUBCUTANEOUS at 21:23

## 2021-04-02 RX ADMIN — Medication 5 MILLIGRAM(S): at 00:00

## 2021-04-02 RX ADMIN — HEPARIN SODIUM 5000 UNIT(S): 5000 INJECTION INTRAVENOUS; SUBCUTANEOUS at 14:08

## 2021-04-02 RX ADMIN — Medication 12.5 MILLIGRAM(S): at 21:23

## 2021-04-02 RX ADMIN — PANTOPRAZOLE SODIUM 40 MILLIGRAM(S): 20 TABLET, DELAYED RELEASE ORAL at 06:40

## 2021-04-02 RX ADMIN — SODIUM CHLORIDE 3 MILLILITER(S): 9 INJECTION INTRAMUSCULAR; INTRAVENOUS; SUBCUTANEOUS at 05:09

## 2021-04-02 RX ADMIN — CHLORHEXIDINE GLUCONATE 1 APPLICATION(S): 213 SOLUTION TOPICAL at 06:40

## 2021-04-02 RX ADMIN — Medication 650 MILLIGRAM(S): at 13:30

## 2021-04-02 NOTE — DIETITIAN INITIAL EVALUATION ADULT. - ADD RECOMMEND
1. Encourage intake through day, honor food preferences 2. Manage pain prn 3. Trend wts 4. Reinforce ed 5. Monitor and replete lytes

## 2021-04-02 NOTE — PROGRESS NOTE ADULT - SUBJECTIVE AND OBJECTIVE BOX
CTICU  CRITICAL  CARE  attending     Hand off received 					   Pertinent clinical, laboratory, radiographic, hemodynamic, echocardiographic, respiratory data, microbiologic data and chart were reviewed and analyzed frequently throughout the course of the day and night      80 years old Female with  HTN, MVP, severe MR, and hypercalcemia.  The patient was evaluated for GRAMAJO after 2 flights of stairs, as well as palpitations and lower extremity edema.   She presented to Dr. Wilcox for surgical evaluation of severe MR.   She denies CP, SOB at rest, orthopnea/PND, syncope, dizziness, fever, chills, cough, or recent illness. Pt reports good compliance with all medications.  TTE 11/2020 revealed EF 70%, mod-severe MR. Subsequent SARAH 12/9/2020 revealed severe MVP with severe MR and flail segment of posterior leaflet.  LHC:  No CAD.    S/P MVR      FAMILY HISTORY:  PAST MEDICAL & SURGICAL HISTORY:  HTN (hypertension)  HLD (hyperlipidemia)  Mitral regurgitation  Mitral prolapse        14 system review was unremarkable    Vital signs, hemodynamic and respiratory parameters were reviewed from the bedside nursing flow sheet.  ICU Vital Signs Last 24 Hrs  T(C): 36.9 (02 Apr 2021 20:49), Max: 37.1 (02 Apr 2021 14:00)  T(F): 98.5 (02 Apr 2021 20:49), Max: 98.7 (02 Apr 2021 14:00)  HR: 81 (02 Apr 2021 21:00) (74 - 87)  BP: 130/63 (02 Apr 2021 21:00) (99/57 - 147/65)  BP(mean): 90 (02 Apr 2021 21:00) (74 - 93)  ABP: --  ABP(mean): --  RR: 15 (02 Apr 2021 20:00) (14 - 18)  SpO2: 93% (02 Apr 2021 21:00) (91% - 98%)    Adult Advanced Hemodynamics Last 24 Hrs  CVP(mm Hg): --  CVP(cm H2O): --  CO: --  CI: --  PA: --  PA(mean): --  PCWP: --  SVR: --  SVRI: --  PVR: --  PVRI: --, ABG - ( 01 Apr 2021 03:17 )  pH, Arterial: 7.41  pH, Blood: x     /  pCO2: 39    /  pO2: 83    / HCO3: 24    / Base Excess: -0.2  /  SaO2: 96                  Intake and output was reviewed and the fluid balance was calculated  Daily     Daily   I&O's Summary    01 Apr 2021 07:01  -  02 Apr 2021 07:00  --------------------------------------------------------  IN: 520 mL / OUT: 1925 mL / NET: -1405 mL    02 Apr 2021 07:01  -  02 Apr 2021 22:26  --------------------------------------------------------  IN: 450 mL / OUT: 690 mL / NET: -240 mL        All lines and drain sites were assessed      Neuro: Follows commands. Moves all 4 extremities.  Neck: No JVD.  CVS: S1, S2, No S3.  Lungs: Good air entry bilaterally.  Abd: Soft. No tenderness. + Bowel sounds.  Vascular: + DP/PT.  Extremities: No edema.  Lymphatic: Normal.  Skin: No abnormalities.      labs  CBC Full  -  ( 02 Apr 2021 03:35 )  WBC Count : 15.18 K/uL  RBC Count : 2.64 M/uL  Hemoglobin : 8.9 g/dL  Hematocrit : 28.4 %  Platelet Count - Automated : 115 K/uL  Mean Cell Volume : 107.6 fl  Mean Cell Hemoglobin : 33.7 pg  Mean Cell Hemoglobin Concentration : 31.3 gm/dL  Auto Neutrophil # : x  Auto Lymphocyte # : x  Auto Monocyte # : x  Auto Eosinophil # : x  Auto Basophil # : x  Auto Neutrophil % : x  Auto Lymphocyte % : x  Auto Monocyte % : x  Auto Eosinophil % : x  Auto Basophil % : x    04-02    137  |  103  |  13  ----------------------------<  129<H>  4.2   |  26  |  0.80    Ca    8.4      02 Apr 2021 03:35  Phos  2.6     04-02  Mg     2.1     04-02    TPro  5.1<L>  /  Alb  3.1<L>  /  TBili  0.4  /  DBili  x   /  AST  24  /  ALT  6<L>  /  AlkPhos  47  04-02    PT/INR - ( 02 Apr 2021 03:35 )   PT: 12.7 sec;   INR: 1.06          PTT - ( 02 Apr 2021 03:35 )  PTT:27.2 sec  The current medications were reviewed   MEDICATIONS  (STANDING):  albuterol/ipratropium for Nebulization. 3 milliLiter(s) Nebulizer once  aspirin enteric coated 81 milliGRAM(s) Oral daily  chlorhexidine 2% Cloths 1 Application(s) Topical <User Schedule>  heparin   Injectable 5000 Unit(s) SubCutaneous every 8 hours  lidocaine   Patch 1 Patch Transdermal daily  melatonin 5 milliGRAM(s) Oral at bedtime  metoprolol tartrate 12.5 milliGRAM(s) Oral every 12 hours  pantoprazole    Tablet 40 milliGRAM(s) Oral before breakfast  polyethylene glycol 3350 17 Gram(s) Oral daily  sodium chloride 0.9% lock flush 3 milliLiter(s) IV Push every 8 hours    MEDICATIONS  (PRN):  acetaminophen   Tablet .. 650 milliGRAM(s) Oral every 6 hours PRN Mild Pain (1 - 3)  benzocaine 15 mG/menthol 3.6 mG Lozenge 1 Lozenge Oral four times a day PRN Sore Throat            80 years old female with mitral regurgitation.  Hemodynamically stable.  Good oxygenation.  Fair urine out put.        My plan includes :  PO afterload reduction.  Statin and Betablocker.  Close hemodynamic, ventilatory and drain monitoring and management  Monitor for arrhythmias and monitor parameters for organ perfusion  Monitor neurologic status  Monitor renal function.  Head of the bed should remain elevated to 45 deg .   Chest PT and IS will be encouraged  Monitor adequacy of oxygenation and ventilation and attempt to wean oxygen  Nutritional goals will be met using po eventually , ensure adequate caloric intake and monitor the same  Stress ulcer and VTE prophylaxis will be achieved    Glycemic control is satisfactory  Electrolytes have been repleted as necessary and wound care has been carried out. Pain control has been achieved.   Aggressive physical therapy and early mobility and ambulation goals will be met   The family was updated about the course and plan  CRITICAL CARE TIME SPENT in evaluation and management, reassessments, review and interpretation of labs and x-rays, ventilator and hemodynamic management, formulating a plan and coordinating care: ___30____ MIN.  Time does not include procedural time.  CTICU ATTENDING     					    Bret Urban MD

## 2021-04-02 NOTE — DIETITIAN INITIAL EVALUATION ADULT. - OTHER CALCULATIONS
Ideal body weight used for calculations as pt >120% of IBW. (122% IBW). Nutrient needs based on [facility] standards of care for maintenance in adults, adjusted for age, post-op needs, fluid per team

## 2021-04-02 NOTE — PROGRESS NOTE ADULT - SUBJECTIVE AND OBJECTIVE BOX
INTERVAL HPI/OVERNIGHT EVENTS:    POD#2: Min Inv MV repair/PFO closure  EF 50%  Cardiologist: Dr. Caren Barr    79 yo Female Hx HTN, MVP, hypercalcemia, severe MR with sxs SOB/GRAMAJO with palpitations and LE edema    ECHO 11/2020 EF 70%, mod-severe MR.  SARAH 12/9: severe MVP w/severe MR and flail segment of posterior leaflet.    Cath: 3/30: LM: normal in size and structure. LAD: mildly torturous. Lcx: no disease, RCA: no disease. Failed radial access. Pressure dressing on. Right groin PC.     To OR today - no intraop blood product. arrived to ICU intubated; no infusions  pacer on backup - not connected   extubated in short post-op period    4/1 - fib - amiodarone started - currently in sinus rhythm     No acute events reported overnight     PMHx includes but is not limited to:   HTN (hypertension)  HLD (hyperlipidemia)  Mitral regurgitation  Mitral prolapse    ICU Vital Signs Last 24 Hrs  T(C): 36.7 (02 Apr 2021 05:01), Max: 37.1 (01 Apr 2021 21:12)  T(F): 98 (02 Apr 2021 05:01), Max: 98.7 (01 Apr 2021 21:12)  HR: 78 (02 Apr 2021 11:00) (67 - 87)  BP: 115/58 (02 Apr 2021 11:00) (102/55 - 149/59)  BP(mean): 82 (02 Apr 2021 11:00) (74 - 92)  ABP: 101/66 (01 Apr 2021 16:00) (100/38 - 119/55)  ABP(mean): 78 (01 Apr 2021 16:00) (57 - 78)  RR: 15 (02 Apr 2021 11:00) (15 - 18)  SpO2: 91% (02 Apr 2021 11:00) (91% - 98%) RA    Qtts: None    I&O's Summary    01 Apr 2021 07:01  -  02 Apr 2021 07:00  --------------------------------------------------------  IN: 520 mL / OUT: 1925 mL / NET: -1405 mL    02 Apr 2021 07:01  -  02 Apr 2021 11:44  --------------------------------------------------------  IN: 0 mL / OUT: 240 mL / NET: -240 mL    Physical Exam    Heart  Lungs  Abd  Ext  Chest  Neuro  Skin    LABS:                        8.9    15.18 )-----------( 115      ( 02 Apr 2021 03:35 )             28.4     04-02    137  |  103  |  13  ----------------------------<  129<H>  4.2   |  26  |  0.80    Ca    8.4      02 Apr 2021 03:35  Phos  2.6     04-02  Mg     2.1     04-02    TPro  5.1<L>  /  Alb  3.1<L>  /  TBili  0.4  /  DBili  x   /  AST  24  /  ALT  6<L>  /  AlkPhos  47  04-02    PT/INR - ( 02 Apr 2021 03:35 )   PT: 12.7 sec;   INR: 1.06          PTT - ( 02 Apr 2021 03:35 )  PTT:27.2 sec    ABG - ( 01 Apr 2021 03:17 )  pH, Arterial: 7.41  pH, Blood: x     /  pCO2: 39    /  pO2: 83    / HCO3: 24    / Base Excess: -0.2  /  SaO2: 96                  RADIOLOGY & ADDITIONAL STUDIES:    I have spent/provided stated minutes of critical care time to this patient:  INTERVAL HPI/OVERNIGHT EVENTS:    POD#2: Min Inv MV repair/PFO closure  EF 50%  Cardiologist: Dr. Caren Barr    79 yo Female Hx HTN, MVP, hypercalcemia, severe MR with sxs SOB/GRAMAJO with palpitations and LE edema    ECHO 11/2020 EF 70%, mod-severe MR.  SARAH 12/9: severe MVP w/severe MR and flail segment of posterior leaflet.    Cath: 3/30: LM: normal in size and structure. LAD: mildly torturous. Lcx: no disease, RCA: no disease. Failed radial access. Pressure dressing on. Right groin PC.     To OR today - no intraop blood product. arrived to ICU intubated; no infusions  pacer on backup - not connected   extubated in short post-op period    4/1 - fib - amiodarone started - currently in sinus rhythm     No acute events reported overnight     PMHx includes but is not limited to:   HTN (hypertension)  HLD (hyperlipidemia)  Mitral regurgitation  Mitral prolapse    ICU Vital Signs Last 24 Hrs  T(C): 36.7 (02 Apr 2021 05:01), Max: 37.1 (01 Apr 2021 21:12)  T(F): 98 (02 Apr 2021 05:01), Max: 98.7 (01 Apr 2021 21:12)  HR: 78 (02 Apr 2021 11:00) (67 - 87)  BP: 115/58 (02 Apr 2021 11:00) (102/55 - 149/59)  BP(mean): 82 (02 Apr 2021 11:00) (74 - 92)  ABP: 101/66 (01 Apr 2021 16:00) (100/38 - 119/55)  ABP(mean): 78 (01 Apr 2021 16:00) (57 - 78)  RR: 15 (02 Apr 2021 11:00) (15 - 18)  SpO2: 91% (02 Apr 2021 11:00) (91% - 98%) RA    Qtts: None    I&O's Summary    01 Apr 2021 07:01  -  02 Apr 2021 07:00  --------------------------------------------------------  IN: 520 mL / OUT: 1925 mL / NET: -1405 mL    02 Apr 2021 07:01  -  02 Apr 2021 11:44  --------------------------------------------------------  IN: 0 mL / OUT: 240 mL / NET: -240 mL    Physical Exam    Heart - regular (-)rub/gallop  Lungs - CTA anterior - no rub/gallop  Abd - (+)BS soft obese; NTND (-)r/r/g  Ext - warm to touch; no cyanosis/clubbing  Chest - op bandage in place  Neuro - pupils pinpoint; unable to further assess at this time  Skin - no rash     LABS:                        8.9    15.18 )-----------( 115      ( 02 Apr 2021 03:35 )             28.4     04-02    137  |  103  |  13  ----------------------------<  129<H>  4.2   |  26  |  0.80    Ca    8.4      02 Apr 2021 03:35  Phos  2.6     04-02  Mg     2.1     04-02    TPro  5.1<L>  /  Alb  3.1<L>  /  TBili  0.4  /  DBili  x   /  AST  24  /  ALT  6<L>  /  AlkPhos  47  04-02    PT/INR - ( 02 Apr 2021 03:35 )   PT: 12.7 sec;   INR: 1.06     PTT - ( 02 Apr 2021 03:35 )  PTT:27.2 sec    ABG - ( 01 Apr 2021 03:17 )  pH, Arterial: 7.41  pH, Blood: x     /  pCO2: 39    /  pO2: 83    / HCO3: 24    / Base Excess: -0.2  /  SaO2: 96        RADIOLOGY & ADDITIONAL STUDIES: reviewed     patient with symptomatic severe MR now POD#2 MV repair    1. CV  hemodynamically stable  sinus rhythm  pacer on backup   complete periop Abx prophylaxis  ASA    2. Pulm   incentive spirometry/ambulate   now on RA    glycemic control - serum 129  reported difficult (anterior) airway requiring glidescope    anticipate transfer to floor when bed available    DVT and GI prophylaxis    d/w Patient/staff and CTS    I have spent/provided stated minutes of critical care time to this patient: 90

## 2021-04-02 NOTE — DIETITIAN INITIAL EVALUATION ADULT. - OTHER INFO
80F with PMHx HTN, MVP, severe MR, and hypercalcemia, presented to Dr. Wilcox for surgical evaluation of severe MR. Pt endorses GRAMAJO after 2 flights of stairs, as well as palpitations and lower extremity edema. TTE 11/2020 revealed EF 70%, mod-severe MR. Subsequent SARAH 12/9/2020 revealed severe MVP w/severe MR and flail segment of posterior leaflet. In light of patient's risk factors, and CCS class 2 anginal/anginal-equivalent symptoms, pt is referred to [facility] for cardiac catheterization w/ possible intervention if clinically indicated to r/o CAD with plan for subsequent admission to [facility] for MVR.  On 3/31 went to OR for PFO closure with SARAH. Remains on 9[facility] for post-op monitoring and management. Observed pt this morning, reports good appetite post-op, feels her diet is bland but otherwise is doing well. Denies n/v/d, no BM post-op, no chewing/ swallowing issues or pain impacting intake, skin with incision to groin, cristóbal score 20. NKFA or changes in wt noted. Encouraged intake through day to meet needs. Wanting to walk around to promote BM. Discussed increasing fluids and high fiber food options. Will follow per protocol.

## 2021-04-03 LAB
ALBUMIN SERPL ELPH-MCNC: 3 G/DL — LOW (ref 3.3–5)
ALP SERPL-CCNC: 53 U/L — SIGNIFICANT CHANGE UP (ref 40–120)
ALT FLD-CCNC: 6 U/L — LOW (ref 10–45)
ANION GAP SERPL CALC-SCNC: 5 MMOL/L — SIGNIFICANT CHANGE UP (ref 5–17)
APTT BLD: 30.2 SEC — SIGNIFICANT CHANGE UP (ref 27.5–35.5)
AST SERPL-CCNC: 23 U/L — SIGNIFICANT CHANGE UP (ref 10–40)
BILIRUB SERPL-MCNC: 0.4 MG/DL — SIGNIFICANT CHANGE UP (ref 0.2–1.2)
BUN SERPL-MCNC: 10 MG/DL — SIGNIFICANT CHANGE UP (ref 7–23)
CALCIUM SERPL-MCNC: 8.5 MG/DL — SIGNIFICANT CHANGE UP (ref 8.4–10.5)
CHLORIDE SERPL-SCNC: 100 MMOL/L — SIGNIFICANT CHANGE UP (ref 96–108)
CO2 SERPL-SCNC: 30 MMOL/L — SIGNIFICANT CHANGE UP (ref 22–31)
CREAT SERPL-MCNC: 0.81 MG/DL — SIGNIFICANT CHANGE UP (ref 0.5–1.3)
GLUCOSE SERPL-MCNC: 119 MG/DL — HIGH (ref 70–99)
HCT VFR BLD CALC: 29.4 % — LOW (ref 34.5–45)
HGB BLD-MCNC: 9.1 G/DL — LOW (ref 11.5–15.5)
INR BLD: 1.01 — SIGNIFICANT CHANGE UP (ref 0.88–1.16)
MAGNESIUM SERPL-MCNC: 2 MG/DL — SIGNIFICANT CHANGE UP (ref 1.6–2.6)
MCHC RBC-ENTMCNC: 31 GM/DL — LOW (ref 32–36)
MCHC RBC-ENTMCNC: 33.2 PG — SIGNIFICANT CHANGE UP (ref 27–34)
MCV RBC AUTO: 107.3 FL — HIGH (ref 80–100)
NRBC # BLD: 0 /100 WBCS — SIGNIFICANT CHANGE UP (ref 0–0)
PHOSPHATE SERPL-MCNC: 1.6 MG/DL — LOW (ref 2.5–4.5)
PLATELET # BLD AUTO: 141 K/UL — LOW (ref 150–400)
POTASSIUM SERPL-MCNC: 4.2 MMOL/L — SIGNIFICANT CHANGE UP (ref 3.5–5.3)
POTASSIUM SERPL-SCNC: 4.2 MMOL/L — SIGNIFICANT CHANGE UP (ref 3.5–5.3)
PROT SERPL-MCNC: 5.7 G/DL — LOW (ref 6–8.3)
PROTHROM AB SERPL-ACNC: 12.1 SEC — SIGNIFICANT CHANGE UP (ref 10.6–13.6)
RBC # BLD: 2.74 M/UL — LOW (ref 3.8–5.2)
RBC # FLD: 13.4 % — SIGNIFICANT CHANGE UP (ref 10.3–14.5)
SODIUM SERPL-SCNC: 135 MMOL/L — SIGNIFICANT CHANGE UP (ref 135–145)
TSH SERPL-MCNC: 1.14 UIU/ML — SIGNIFICANT CHANGE UP (ref 0.35–4.94)
WBC # BLD: 12.96 K/UL — HIGH (ref 3.8–10.5)
WBC # FLD AUTO: 12.96 K/UL — HIGH (ref 3.8–10.5)

## 2021-04-03 PROCEDURE — 71045 X-RAY EXAM CHEST 1 VIEW: CPT | Mod: 26,77

## 2021-04-03 PROCEDURE — 71045 X-RAY EXAM CHEST 1 VIEW: CPT | Mod: 26

## 2021-04-03 PROCEDURE — 93306 TTE W/DOPPLER COMPLETE: CPT | Mod: 26

## 2021-04-03 RX ORDER — POTASSIUM PHOSPHATE, MONOBASIC POTASSIUM PHOSPHATE, DIBASIC 236; 224 MG/ML; MG/ML
30 INJECTION, SOLUTION INTRAVENOUS ONCE
Refills: 0 | Status: DISCONTINUED | OUTPATIENT
Start: 2021-04-03 | End: 2021-04-03

## 2021-04-03 RX ORDER — FUROSEMIDE 40 MG
20 TABLET ORAL DAILY
Refills: 0 | Status: DISCONTINUED | OUTPATIENT
Start: 2021-04-03 | End: 2021-04-04

## 2021-04-03 RX ORDER — AMIODARONE HYDROCHLORIDE 400 MG/1
TABLET ORAL
Refills: 0 | Status: DISCONTINUED | OUTPATIENT
Start: 2021-04-03 | End: 2021-04-04

## 2021-04-03 RX ORDER — AMIODARONE HYDROCHLORIDE 400 MG/1
200 TABLET ORAL DAILY
Refills: 0 | Status: CANCELLED | OUTPATIENT
Start: 2021-04-07 | End: 2021-04-04

## 2021-04-03 RX ORDER — BENZOCAINE AND MENTHOL 5; 1 G/100ML; G/100ML
1 LIQUID ORAL THREE TIMES A DAY
Refills: 0 | Status: DISCONTINUED | OUTPATIENT
Start: 2021-04-03 | End: 2021-04-03

## 2021-04-03 RX ORDER — AMIODARONE HYDROCHLORIDE 400 MG/1
400 TABLET ORAL EVERY 8 HOURS
Refills: 0 | Status: DISCONTINUED | OUTPATIENT
Start: 2021-04-03 | End: 2021-04-04

## 2021-04-03 RX ORDER — POTASSIUM CHLORIDE 20 MEQ
10 PACKET (EA) ORAL DAILY
Refills: 0 | Status: DISCONTINUED | OUTPATIENT
Start: 2021-04-03 | End: 2021-04-04

## 2021-04-03 RX ADMIN — LIDOCAINE 1 PATCH: 4 CREAM TOPICAL at 18:18

## 2021-04-03 RX ADMIN — LIDOCAINE 1 PATCH: 4 CREAM TOPICAL at 22:03

## 2021-04-03 RX ADMIN — Medication 12.5 MILLIGRAM(S): at 05:56

## 2021-04-03 RX ADMIN — Medication 650 MILLIGRAM(S): at 05:00

## 2021-04-03 RX ADMIN — AMIODARONE HYDROCHLORIDE 400 MILLIGRAM(S): 400 TABLET ORAL at 15:42

## 2021-04-03 RX ADMIN — SODIUM CHLORIDE 3 MILLILITER(S): 9 INJECTION INTRAMUSCULAR; INTRAVENOUS; SUBCUTANEOUS at 10:28

## 2021-04-03 RX ADMIN — Medication 81 MILLIGRAM(S): at 10:25

## 2021-04-03 RX ADMIN — Medication 85 MILLIMOLE(S): at 07:28

## 2021-04-03 RX ADMIN — BENZOCAINE AND MENTHOL 1 LOZENGE: 5; 1 LIQUID ORAL at 05:00

## 2021-04-03 RX ADMIN — Medication 3 MILLILITER(S): at 10:27

## 2021-04-03 RX ADMIN — Medication 650 MILLIGRAM(S): at 15:42

## 2021-04-03 RX ADMIN — Medication 650 MILLIGRAM(S): at 15:43

## 2021-04-03 RX ADMIN — Medication 10 MILLIEQUIVALENT(S): at 10:29

## 2021-04-03 RX ADMIN — BENZOCAINE AND MENTHOL 1 LOZENGE: 5; 1 LIQUID ORAL at 18:41

## 2021-04-03 RX ADMIN — BENZOCAINE AND MENTHOL 1 LOZENGE: 5; 1 LIQUID ORAL at 22:04

## 2021-04-03 RX ADMIN — CHLORHEXIDINE GLUCONATE 1 APPLICATION(S): 213 SOLUTION TOPICAL at 06:15

## 2021-04-03 RX ADMIN — SODIUM CHLORIDE 3 MILLILITER(S): 9 INJECTION INTRAMUSCULAR; INTRAVENOUS; SUBCUTANEOUS at 06:08

## 2021-04-03 RX ADMIN — SODIUM CHLORIDE 3 MILLILITER(S): 9 INJECTION INTRAMUSCULAR; INTRAVENOUS; SUBCUTANEOUS at 21:26

## 2021-04-03 RX ADMIN — HEPARIN SODIUM 5000 UNIT(S): 5000 INJECTION INTRAVENOUS; SUBCUTANEOUS at 22:03

## 2021-04-03 RX ADMIN — BENZOCAINE AND MENTHOL 1 LOZENGE: 5; 1 LIQUID ORAL at 15:57

## 2021-04-03 RX ADMIN — Medication 12.5 MILLIGRAM(S): at 18:41

## 2021-04-03 RX ADMIN — BENZOCAINE AND MENTHOL 1 LOZENGE: 5; 1 LIQUID ORAL at 11:17

## 2021-04-03 RX ADMIN — PANTOPRAZOLE SODIUM 40 MILLIGRAM(S): 20 TABLET, DELAYED RELEASE ORAL at 06:09

## 2021-04-03 RX ADMIN — Medication 650 MILLIGRAM(S): at 04:00

## 2021-04-03 RX ADMIN — AMIODARONE HYDROCHLORIDE 400 MILLIGRAM(S): 400 TABLET ORAL at 22:03

## 2021-04-03 RX ADMIN — HEPARIN SODIUM 5000 UNIT(S): 5000 INJECTION INTRAVENOUS; SUBCUTANEOUS at 14:42

## 2021-04-03 RX ADMIN — HEPARIN SODIUM 5000 UNIT(S): 5000 INJECTION INTRAVENOUS; SUBCUTANEOUS at 05:55

## 2021-04-03 RX ADMIN — Medication 20 MILLIGRAM(S): at 10:25

## 2021-04-03 RX ADMIN — LIDOCAINE 1 PATCH: 4 CREAM TOPICAL at 10:29

## 2021-04-03 RX ADMIN — Medication 5 MILLIGRAM(S): at 22:01

## 2021-04-03 NOTE — PROGRESS NOTE ADULT - SUBJECTIVE AND OBJECTIVE BOX
Patient discussed on morning rounds with Dr. Corrigan    Operation / Date: mini MV repair & PFO closure EF nml / 3-31      SUBJECTIVE ASSESSMENT:  80y Female doing well    Vital Signs Last 24 Hrs  T(C): 37.5 (03 Apr 2021 16:43), Max: 37.5 (03 Apr 2021 16:43)  T(F): 99.5 (03 Apr 2021 16:43), Max: 99.5 (03 Apr 2021 16:43)  HR: 91 (03 Apr 2021 16:28) (74 - 91)  BP: 130/58 (03 Apr 2021 16:28) (110/56 - 150/70)  BP(mean): 87 (03 Apr 2021 16:28) (78 - 101) SR  RR: 16 (03 Apr 2021 16:28) (14 - 21)  SpO2: 98% (03 Apr 2021 16:28) (93% - 100%) RA  I&O's Detail    02 Apr 2021 07:01  -  03 Apr 2021 07:00  --------------------------------------------------------  IN:    Oral Fluid: 890 mL  Total IN: 890 mL    OUT:    Bulb (mL): 0 mL    Bulb (mL): 90 mL    Voided (mL): 1350 mL  Total OUT: 1440 mL    Total NET: -550 mL      03 Apr 2021 07:01  -  03 Apr 2021 17:24  --------------------------------------------------------  IN:    IV PiggyBack: 416.5 mL    Oral Fluid: 520 mL  Total IN: 936.5 mL    OUT:    Bulb (mL): 0 mL  Total OUT: 0 mL    Total NET: 936.5 mL        PHYSICAL EXAM:    General: NAD    Neurological: A&O, non focal    Cardiovascular: RRR, no m/r/g    Respiratory: CTABL    Gastrointestinal: + BS, soft, non tender    Extremities: warm, no edema    Incision Sites: c/d/i      LABS:                        9.1    12.96 )-----------( 141      ( 03 Apr 2021 04:42 )             29.4       PT/INR - ( 03 Apr 2021 04:42 )   PT: 12.1 sec;   INR: 1.01          PTT - ( 03 Apr 2021 04:42 )  PTT:30.2 sec    04-03    135  |  100  |  10  ----------------------------<  119<H>  4.2   |  30  |  0.81    Ca    8.5      03 Apr 2021 04:42  Phos  1.6     04-03  Mg     2.0     04-03    TPro  5.7<L>  /  Alb  3.0<L>  /  TBili  0.4  /  DBili  x   /  AST  23  /  ALT  6<L>  /  AlkPhos  53  04-03          MEDICATIONS  (STANDING):  aMIOdarone    Tablet 400 milliGRAM(s) Oral every 8 hours  aspirin enteric coated 81 milliGRAM(s) Oral daily  furosemide    Tablet 20 milliGRAM(s) Oral daily  heparin   Injectable 5000 Unit(s) SubCutaneous every 8 hours  lidocaine   Patch 1 Patch Transdermal daily  melatonin 5 milliGRAM(s) Oral at bedtime  metoprolol tartrate 12.5 milliGRAM(s) Oral every 12 hours  pantoprazole    Tablet 40 milliGRAM(s) Oral before breakfast  polyethylene glycol 3350 17 Gram(s) Oral daily  potassium chloride    Tablet ER 10 milliEquivalent(s) Oral daily    MEDICATIONS  (PRN):  acetaminophen   Tablet .. 650 milliGRAM(s) Oral every 6 hours PRN Mild Pain (1 - 3)  benzocaine 15 mG/menthol 3.6 mG Lozenge 1 Lozenge Oral four times a day PRN Sore Throat

## 2021-04-04 ENCOUNTER — TRANSCRIPTION ENCOUNTER (OUTPATIENT)
Age: 81
End: 2021-04-04

## 2021-04-04 VITALS — TEMPERATURE: 100 F

## 2021-04-04 LAB
ANION GAP SERPL CALC-SCNC: 9 MMOL/L — SIGNIFICANT CHANGE UP (ref 5–17)
BUN SERPL-MCNC: 8 MG/DL — SIGNIFICANT CHANGE UP (ref 7–23)
CALCIUM SERPL-MCNC: 8.4 MG/DL — SIGNIFICANT CHANGE UP (ref 8.4–10.5)
CHLORIDE SERPL-SCNC: 105 MMOL/L — SIGNIFICANT CHANGE UP (ref 96–108)
CO2 SERPL-SCNC: 27 MMOL/L — SIGNIFICANT CHANGE UP (ref 22–31)
CREAT SERPL-MCNC: 0.75 MG/DL — SIGNIFICANT CHANGE UP (ref 0.5–1.3)
GLUCOSE SERPL-MCNC: 112 MG/DL — HIGH (ref 70–99)
HCT VFR BLD CALC: 29.6 % — LOW (ref 34.5–45)
HGB BLD-MCNC: 9.3 G/DL — LOW (ref 11.5–15.5)
MAGNESIUM SERPL-MCNC: 2 MG/DL — SIGNIFICANT CHANGE UP (ref 1.6–2.6)
MCHC RBC-ENTMCNC: 31.4 GM/DL — LOW (ref 32–36)
MCHC RBC-ENTMCNC: 33.1 PG — SIGNIFICANT CHANGE UP (ref 27–34)
MCV RBC AUTO: 105.3 FL — HIGH (ref 80–100)
NRBC # BLD: 0 /100 WBCS — SIGNIFICANT CHANGE UP (ref 0–0)
PLATELET # BLD AUTO: 186 K/UL — SIGNIFICANT CHANGE UP (ref 150–400)
POTASSIUM SERPL-MCNC: 3.8 MMOL/L — SIGNIFICANT CHANGE UP (ref 3.5–5.3)
POTASSIUM SERPL-SCNC: 3.8 MMOL/L — SIGNIFICANT CHANGE UP (ref 3.5–5.3)
RBC # BLD: 2.81 M/UL — LOW (ref 3.8–5.2)
RBC # FLD: 13.2 % — SIGNIFICANT CHANGE UP (ref 10.3–14.5)
SARS-COV-2 RNA SPEC QL NAA+PROBE: SIGNIFICANT CHANGE UP
SODIUM SERPL-SCNC: 141 MMOL/L — SIGNIFICANT CHANGE UP (ref 135–145)
WBC # BLD: 10.06 K/UL — SIGNIFICANT CHANGE UP (ref 3.8–10.5)
WBC # FLD AUTO: 10.06 K/UL — SIGNIFICANT CHANGE UP (ref 3.8–10.5)

## 2021-04-04 PROCEDURE — 97161 PT EVAL LOW COMPLEX 20 MIN: CPT

## 2021-04-04 PROCEDURE — 84132 ASSAY OF SERUM POTASSIUM: CPT

## 2021-04-04 PROCEDURE — C1887: CPT

## 2021-04-04 PROCEDURE — 86923 COMPATIBILITY TEST ELECTRIC: CPT

## 2021-04-04 PROCEDURE — 84100 ASSAY OF PHOSPHORUS: CPT

## 2021-04-04 PROCEDURE — 85730 THROMBOPLASTIN TIME PARTIAL: CPT

## 2021-04-04 PROCEDURE — 82803 BLOOD GASES ANY COMBINATION: CPT

## 2021-04-04 PROCEDURE — 36415 COLL VENOUS BLD VENIPUNCTURE: CPT

## 2021-04-04 PROCEDURE — 84295 ASSAY OF SERUM SODIUM: CPT

## 2021-04-04 PROCEDURE — 82550 ASSAY OF CK (CPK): CPT

## 2021-04-04 PROCEDURE — C1760: CPT

## 2021-04-04 PROCEDURE — 83735 ASSAY OF MAGNESIUM: CPT

## 2021-04-04 PROCEDURE — 86769 SARS-COV-2 COVID-19 ANTIBODY: CPT

## 2021-04-04 PROCEDURE — 86901 BLOOD TYPING SEROLOGIC RH(D): CPT

## 2021-04-04 PROCEDURE — 80053 COMPREHEN METABOLIC PANEL: CPT

## 2021-04-04 PROCEDURE — 83036 HEMOGLOBIN GLYCOSYLATED A1C: CPT

## 2021-04-04 PROCEDURE — 71046 X-RAY EXAM CHEST 2 VIEWS: CPT

## 2021-04-04 PROCEDURE — P9045: CPT

## 2021-04-04 PROCEDURE — 71046 X-RAY EXAM CHEST 2 VIEWS: CPT | Mod: 26

## 2021-04-04 PROCEDURE — 94150 VITAL CAPACITY TEST: CPT

## 2021-04-04 PROCEDURE — U0005: CPT

## 2021-04-04 PROCEDURE — 93306 TTE W/DOPPLER COMPLETE: CPT

## 2021-04-04 PROCEDURE — 81003 URINALYSIS AUTO W/O SCOPE: CPT

## 2021-04-04 PROCEDURE — 88305 TISSUE EXAM BY PATHOLOGIST: CPT

## 2021-04-04 PROCEDURE — 86891 AUTOLOGOUS BLOOD OP SALVAGE: CPT

## 2021-04-04 PROCEDURE — 71045 X-RAY EXAM CHEST 1 VIEW: CPT

## 2021-04-04 PROCEDURE — 85027 COMPLETE CBC AUTOMATED: CPT

## 2021-04-04 PROCEDURE — 94640 AIRWAY INHALATION TREATMENT: CPT

## 2021-04-04 PROCEDURE — 82962 GLUCOSE BLOOD TEST: CPT

## 2021-04-04 PROCEDURE — C1769: CPT

## 2021-04-04 PROCEDURE — 76604 US EXAM CHEST: CPT | Mod: 26

## 2021-04-04 PROCEDURE — 80048 BASIC METABOLIC PNL TOTAL CA: CPT

## 2021-04-04 PROCEDURE — 86140 C-REACTIVE PROTEIN: CPT

## 2021-04-04 PROCEDURE — 85610 PROTHROMBIN TIME: CPT

## 2021-04-04 PROCEDURE — 86900 BLOOD TYPING SEROLOGIC ABO: CPT

## 2021-04-04 PROCEDURE — U0003: CPT

## 2021-04-04 PROCEDURE — 80061 LIPID PANEL: CPT

## 2021-04-04 PROCEDURE — 82330 ASSAY OF CALCIUM: CPT

## 2021-04-04 PROCEDURE — C1894: CPT

## 2021-04-04 PROCEDURE — C1889: CPT

## 2021-04-04 PROCEDURE — 85025 COMPLETE CBC W/AUTO DIFF WBC: CPT

## 2021-04-04 PROCEDURE — 86850 RBC ANTIBODY SCREEN: CPT

## 2021-04-04 PROCEDURE — 84443 ASSAY THYROID STIM HORMONE: CPT

## 2021-04-04 PROCEDURE — 93880 EXTRACRANIAL BILAT STUDY: CPT

## 2021-04-04 PROCEDURE — 82553 CREATINE MB FRACTION: CPT

## 2021-04-04 RX ORDER — FUROSEMIDE 40 MG
1 TABLET ORAL
Qty: 0 | Refills: 0 | DISCHARGE

## 2021-04-04 RX ORDER — METOPROLOL TARTRATE 50 MG
1 TABLET ORAL
Qty: 0 | Refills: 0 | DISCHARGE

## 2021-04-04 RX ORDER — ACETAMINOPHEN 500 MG
2 TABLET ORAL
Qty: 0 | Refills: 0 | DISCHARGE
Start: 2021-04-04

## 2021-04-04 RX ORDER — LOSARTAN POTASSIUM 100 MG/1
1 TABLET, FILM COATED ORAL
Qty: 0 | Refills: 0 | DISCHARGE

## 2021-04-04 RX ORDER — METOPROLOL TARTRATE 50 MG
1 TABLET ORAL
Qty: 30 | Refills: 0
Start: 2021-04-04 | End: 2021-05-03

## 2021-04-04 RX ORDER — POTASSIUM CHLORIDE 20 MEQ
1 PACKET (EA) ORAL
Qty: 30 | Refills: 0
Start: 2021-04-04 | End: 2021-05-03

## 2021-04-04 RX ORDER — OMEPRAZOLE 10 MG/1
1 CAPSULE, DELAYED RELEASE ORAL
Qty: 0 | Refills: 0 | DISCHARGE

## 2021-04-04 RX ORDER — FUROSEMIDE 40 MG
1 TABLET ORAL
Qty: 30 | Refills: 0
Start: 2021-04-04 | End: 2021-05-03

## 2021-04-04 RX ORDER — POTASSIUM CHLORIDE 20 MEQ
1 PACKET (EA) ORAL
Qty: 0 | Refills: 0 | DISCHARGE
Start: 2021-04-04

## 2021-04-04 RX ORDER — AMIODARONE HYDROCHLORIDE 400 MG/1
400 TABLET ORAL ONCE
Refills: 0 | Status: DISCONTINUED | OUTPATIENT
Start: 2021-04-04 | End: 2021-04-04

## 2021-04-04 RX ORDER — AMIODARONE HYDROCHLORIDE 400 MG/1
1 TABLET ORAL
Qty: 14 | Refills: 0
Start: 2021-04-04 | End: 2021-04-08

## 2021-04-04 RX ORDER — FUROSEMIDE 40 MG
1 TABLET ORAL
Qty: 0 | Refills: 0 | DISCHARGE
Start: 2021-04-04

## 2021-04-04 RX ORDER — OMEPRAZOLE 10 MG/1
1 CAPSULE, DELAYED RELEASE ORAL
Qty: 30 | Refills: 0
Start: 2021-04-04 | End: 2021-05-03

## 2021-04-04 RX ADMIN — SODIUM CHLORIDE 3 MILLILITER(S): 9 INJECTION INTRAMUSCULAR; INTRAVENOUS; SUBCUTANEOUS at 05:16

## 2021-04-04 RX ADMIN — PANTOPRAZOLE SODIUM 40 MILLIGRAM(S): 20 TABLET, DELAYED RELEASE ORAL at 05:42

## 2021-04-04 RX ADMIN — CHLORHEXIDINE GLUCONATE 1 APPLICATION(S): 213 SOLUTION TOPICAL at 05:42

## 2021-04-04 RX ADMIN — LIDOCAINE 1 PATCH: 4 CREAM TOPICAL at 12:01

## 2021-04-04 RX ADMIN — Medication 20 MILLIGRAM(S): at 05:42

## 2021-04-04 RX ADMIN — Medication 10 MILLIEQUIVALENT(S): at 12:01

## 2021-04-04 RX ADMIN — AMIODARONE HYDROCHLORIDE 400 MILLIGRAM(S): 400 TABLET ORAL at 05:42

## 2021-04-04 RX ADMIN — POLYETHYLENE GLYCOL 3350 17 GRAM(S): 17 POWDER, FOR SOLUTION ORAL at 12:01

## 2021-04-04 RX ADMIN — HEPARIN SODIUM 5000 UNIT(S): 5000 INJECTION INTRAVENOUS; SUBCUTANEOUS at 05:42

## 2021-04-04 RX ADMIN — AMIODARONE HYDROCHLORIDE 400 MILLIGRAM(S): 400 TABLET ORAL at 13:56

## 2021-04-04 RX ADMIN — Medication 12.5 MILLIGRAM(S): at 05:41

## 2021-04-04 RX ADMIN — Medication 81 MILLIGRAM(S): at 12:01

## 2021-04-04 NOTE — PROGRESS NOTE ADULT - ASSESSMENT
79 y/o Female with PMHx significant for hypertension, hypercalcemia, MVP, and severe MR. On 3/31/2 she underwent mini MV repair & PFO closure with normal EF. Her OR course was uncomplicated and she arrived to ICU on no gtt, she was extubated POD #0. On POD1 Amio bolus was given for ectopy. POD2 afib, started on PO Amio, converted to NSR. P    Neuro:   -PRN Tylenol    Cardiac:  -80s SR, SBP 130s. Metoprolol changed to 25mg daily for discharge  -PO Amio load for postop afib  -Lasix/KCL for MVR  -Con't ASA    GI:  -Protonix for ulcer ppx  -Tolerating POs  -Miralax for Bowel regimen    Heme:   -SQ Hep for DVT ppx    Dispo:  -Home today
81 y/o Female with PMHx significant for hypertension, hypercalcemia, MVP, and severe MR. On 3/31/2 she underwent mini MV repair & PFO closure with normal EF. Her OR course was uncomplicated and she arrived to ICU on no gtt, she was extubated POD #0. On POD1 Amio bolus was given for ectopy. POD2 afib, started on PO Amio, converted to NSR. POD3 tolerating BB, deemed stable for transfer to floor care.      Neuro:   -PRN Tylenol, Lidocaine patch   -Melatonin for sleep    Cardiac:  -90s SR, SBP 130s. Metoprolol 12.5ng BID  -PO Amio load for postop afib  -Lasix/KCL for MVR  -Con't ASA    GI:  -Protonix for ulcer ppx  -Tolerating POs  -Miralax for Bowel regimen      Heme:   -SQ Hep for DVT ppx    Dispo:  -Home when medically cleared
81 y/o Female with PMHx HTN, MVP, severe MR, and hypercalcemia, presented to Dr. Wilcox for surgical evaluation of severe MR. Pt endorses GRAMAJO after 2 flights of stairs, as well as palpitations and lower extremity edema. She denies CP, SOB at rest, orthopnea/PND, syncope, dizziness, fever, chills, cough, or recent illness. Pt reports good compliance with all medications including Aspirin 81mg daily, which she states she last took the morning of 3/29. TTE 11/2020 revealed EF 70%, mod-severe MR. Subsequent SARAH 12/9/2020 revealed severe MVP w/severe MR and flail segment of posterior leaflet. Patient referred to Dr. Wilcox for surgical evaluation and deemed a good surgical candidate and presented to cath lab today for diagnostic cath which revealed clean coronaries. Patient being admitted to  to complete PST in anticipation for OR tomorrow.     Plan:    Neurovascular:   -Pain well controlled with current regimen. PRN's: acetaminophen     Cardiovascular:   -pre-op MVR minimally invasive tomorrow with Dr. Wilcox   -c/w ASA, BB  -Hemodynamically stable.   -Monitor: BP, HR, tele    Respiratory:   -Oxygenating well on room air  -Encourage continued use of IS 10x/hr and frequent ambulation  -CXR: pending PA/lateral  -pending bedside PFTs     GI:   -GI PPX: pantoprazole 40mg daily   -PO Diet: DASH/TLC, NPO after midnight   -Bowel Regimen: senna    Renal / :  -Continue to monitor renal function: BUN/Cr 12/0.97   -Monitor I/O's daily     Endocrine:    -No hx of DM or thyroid dx  -A1c: 5.3    Hematologic:  -CBC: H/H- 14/45   -Coagulation Panel.    ID:  -Temperature: afebrile   -CBC: WBC- 7.2   -Continue to observe for SIRS/Sepsis Syndrome.    Prophylaxis:  -DVT prophylaxis with 5000 SubQ Heparin q8h.  -Continue with SCD's b/l while patient is at rest     Disposition:  -OR tomorrow with Dr. Wilcox

## 2021-04-04 NOTE — DISCHARGE NOTE NURSING/CASE MANAGEMENT/SOCIAL WORK - NSDCFUADDAPPT_GEN_ALL_CORE_FT
-Please follow up with Dr. Wilcox in 1-2 weeks.  The office is located at [facility], [facility], 4th floor. Call us with any questions #[phone].    -Follow up with your cardiologist, Dr. Barr, in 1-2 weeks. Call the office for an appointment.     -Walk daily as tolerated and use your incentive spirometer every hour.    -No driving or strenuous activity/exercise for 6 weeks, or until cleared by your surgeon.    -Gently clean your incisions with anti-bacterial soap and water, pat dry.  You may leave them open to air.    -Call your doctor if you have shortness of breath, chest pain not relieved by pain medication, dizziness, fever >101.5, or increased redness or drainage from incisions.

## 2021-04-04 NOTE — PROGRESS NOTE ADULT - SUBJECTIVE AND OBJECTIVE BOX
Patient discussed on morning rounds with Dr. Corrigan    Operation / Date: Mini MVrepair, PFO closure on 3/31/21    Surgeon: Brenden    Referring Physician: Cortney    SUBJECTIVE ASSESSMENT:  80y Female doing well, no acute issues    Hospital Course: 80 year old female with past medical history of  hypertension, hypercalcemia, Mitral valve prolapse, and severe Mitral regurgitation. She was referred to Dr. Wilcox for surgical intervention. She was admitted to [facility] on 3/30/21 and underwent cardiac catheterization which showed no significant coronary artery disease.     On 3/31/21 underwent Mitral valve repair via right mini thoracotomy incision and PFO closure with Dr. Wilcox on 3/31/21. Intraoperative transesophageal echocardiogram showed normal left ventricular function. Patient tolerated the procedure well and transferred to the cardiac surgical intensive care unit in stable condition. She weaned from sedation, awoke neurologically intact, and was extubated on postoperative day zero. Postoperative day two she developed atrial fibrillation and was treated with Amiodarone. She converted to normal sinus rhythm. Betablocker was titrated. She was transferred to the surgical step down unit on postoperative day three. She ambulated well independently and continued to make steady progress. Discharge chest x-ray (pa and lateral) demonstrated small bilateral pleural effusion. Ultrasound of chest demonstrated small right pleural effusion and trace left pleural effusion, neither amenable to drainage. Her oxygen levels were good on  room air. She continued on diuresis. She was discharged home on postoperative day four.     Vital Signs Last 24 Hrs  T(C): 37 (04 Apr 2021 09:00), Max: 38 (03 Apr 2021 21:42)  T(F): 98.6 (04 Apr 2021 09:00), Max: 100.4 (03 Apr 2021 21:42)  HR: 82 (04 Apr 2021 12:00) (79 - 91) SR  BP: 119/578 (04 Apr 2021 09:00) (110/67 - 139/68)  BP(mean): 92 (04 Apr 2021 04:53) (79 - 94)  RR: 18 (04 Apr 2021 12:00) (15 - 22)  SpO2: 93% (04 Apr 2021 12:00) (93% - 98%) RA  I&O's Detail    03 Apr 2021 07:01  -  04 Apr 2021 07:00  --------------------------------------------------------  IN:    IV PiggyBack: 416.5 mL    Oral Fluid: 520 mL  Total IN: 936.5 mL    OUT:    Bulb (mL): 0 mL    Voided (mL): 420 mL  Total OUT: 420 mL    Total NET: 516.5 mL      04 Apr 2021 07:01  -  04 Apr 2021 12:45  --------------------------------------------------------  IN:    Oral Fluid: 240 mL  Total IN: 240 mL    OUT:  Total OUT: 0 mL    Total NET: 240 mL          EPICARDIAL WIRES REMOVED: Yes.  TIE DOWNS REMOVED: Yes.    PHYSICAL EXAM:    General: NAD    Neurological: A&O, non focal    Cardiovascular: RRR, no m/r/g    Respiratory: CTABL    Gastrointestinal: + BS, soft, non tender    Extremities: warm, no edema    Incision Sites: c/d/i      LABS:                        9.3    10.06 )-----------( 186      ( 04 Apr 2021 06:19 )             29.6       PT/INR - ( 03 Apr 2021 04:42 )   PT: 12.1 sec;   INR: 1.01          PTT - ( 03 Apr 2021 04:42 )  PTT:30.2 sec    04-04    141  |  105  |  8   ----------------------------<  112<H>  3.8   |  27  |  0.75    Ca    8.4      04 Apr 2021 06:19  Phos  1.6     04-03  Mg     2.0     04-04    TPro  5.7<L>  /  Alb  3.0<L>  /  TBili  0.4  /  DBili  x   /  AST  23  /  ALT  6<L>  /  AlkPhos  53  04-03          MEDICATIONS  (STANDING):  aMIOdarone    Tablet 400 milliGRAM(s) Oral every 8 hours  aspirin enteric coated 81 milliGRAM(s) Oral daily  furosemide    Tablet 20 milliGRAM(s) Oral daily  heparin   Injectable 5000 Unit(s) SubCutaneous every 8 hours  melatonin 5 milliGRAM(s) Oral at bedtime  metoprolol tartrate 12.5 milliGRAM(s) Oral every 12 hours  pantoprazole    Tablet 40 milliGRAM(s) Oral before breakfast  polyethylene glycol 3350 17 Gram(s) Oral daily  potassium chloride    Tablet ER 10 milliEquivalent(s) Oral daily      Discharge CXR: < from: Xray Chest 2 Views PA/Lat (04.04.21 @ 08:40) >  IMPRESSION: Bilateral effusions. Discoid change and/or infiltrate right lung base    < end of copied text >

## 2021-04-04 NOTE — DISCHARGE NOTE PROVIDER - NSDCFUADDAPPT_GEN_ALL_CORE_FT
-Please follow up with Dr. Wilcox in 1-2 weeks.  The office is located at [facility], [facility], 4th floor. Call us with any questions #[phone].    -Follow up with your cardiologist, Dr. Barr, in 1-2 weeks. Call the office for an appointment.     -Walk daily as tolerated and use your incentive spirometer every hour.    -No driving or strenuous activity/exercise for 6 weeks, or until cleared by your surgeon.    -Gently clean your incisions with anti-bacterial soap and water, pat dry.  You may leave them open to air.    -Call your doctor if you have shortness of breath, chest pain not relieved by pain medication, dizziness, fever >101.5, or increased redness or drainage from incisions. -Please follow up with Dr. Wilcox in 1-2 weeks.  The office is located at [facility], [facility], 4th floor. Call us with any questions #[phone]. Please call Monday to schedule your follow up appointment.     -Follow up with your cardiologist, Dr. Barr, or your cardiologist in 1-2 weeks. Call the office for an appointment.     -Walk daily as tolerated and use your incentive spirometer every hour.    -No driving or strenuous activity/exercise for 6 weeks, or until cleared by your surgeon.    -Gently clean your incisions with anti-bacterial soap and water, pat dry.  You may leave them open to air.    -Call your doctor if you have shortness of breath, chest pain not relieved by pain medication, dizziness, fever >101.5, or increased redness or drainage from incisions.      ****Make sure to take Amiodarone as instructed as discussed. Take Amiodarone 400 mg tablet three times a day until Thursday 4/8/21. On Friday 4/9/21 START taking Amiodarone 200 mg tablet, one once a day for 30 days unless otherwise instructed by your doctor.

## 2021-04-04 NOTE — DISCHARGE NOTE PROVIDER - CARE PROVIDER_API CALL
Antonio Wilcox)  Cardiovascular Surgery  [address], [address] [place] [address]  Phone: [phone]  Fax: [phone]  Follow Up Time:     Caren Barr)  Cardiovascular Disease; Internal Medicine  [address] [address]  Phone: [phone]  Fax: [phone]  Follow Up Time:

## 2021-04-04 NOTE — CHART NOTE - NSCHARTNOTEFT_GEN_A_CORE
As per Dr. Wilcox, mediastinal abhi removed at bedside without incident. Occlusive dressing applied. Patient tolerated procedure well. Follow up CXR without obvious ptx, official report pending.
CT Removal:    Pt seen and examined at bedside.  Case discussed with Dr. Wilcox. Minimal output from CTs.  No air leak appreciated.  Per Dr. Wilcox request, CT removed and tie down secured without incident.  Occlusive DSD placed.  CXR no obvious PTX noted.  Pt tolerated procedure well.
Exam:  US Chest    Procedure Date: 4/4/21    History: 80y Female whose CXR today shows a possible bilateral pleural effusion.  Pt is POD # 4 from Mini MVRepair and PFO closure.       Findings:                    Evaluation of the right side of the thoracic cavity demonstrates small pleural effusion                  Evaluation of the left side of the thoracic cavity demonstrates trace pleural effusion                  Impression:  small right and trace left pleural effusion not amenable to drainage

## 2021-04-04 NOTE — DISCHARGE NOTE PROVIDER - HOSPITAL COURSE
80 year old female with past medical history of  hypertension, hypercalcemia, Mitral valve prolapse, and severe Mitral regurgitation. She was referred to Dr. Wilcox for surgical intervention. She was admitted to [facility] on 3/30/21 and underwent cardiac catheterization which showed no significant coronary artery disease.     On 3/31/21 underwent Mitral valve repair via right mini thoracotomy incision and PFO closure with Dr. Wilcox on 3/31/21. Intraoperative transesophageal echocardiogram showed normal left ventricular function. Patient tolerated the procedure well and transferred to the cardiac surgical intensive care unit in stable condition. She weaned from sedation, awoke neurologically intact, and was extubated on postoperative day zero. Postoperative day two she developed atrial fibrillation and was treated with Amiodarone. She converted to normal sinus rhythm. Betablocker was titrated. She was transferred to the surgical step down unit on postoperative day three. She ambulated well independently and continued to make steady progress. Discharge chest x-ray (pa and lateral) demonstrated small bilateral pleural effusion. Ultrasound of chest demonstrated small right pleural effusion and trace left pleural effusion, neither amenable to drainage. Her oxygen levels were good on  room air. She continued on diuresis. She was discharged home on postoperative day four.

## 2021-04-04 NOTE — PROGRESS NOTE ADULT - PROVIDER SPECIALTY LIST ADULT
Critical Care
CT Surgery
Critical Care
Critical Care
CT Surgery
Critical Care
Critical Care
CT Surgery

## 2021-04-04 NOTE — DISCHARGE NOTE PROVIDER - NSDCCPTREATMENT_GEN_ALL_CORE_FT
PRINCIPAL PROCEDURE  Procedure: Repair, mitral valve, with SARAH  Findings and Treatment: Minimally invasive, and closure or PFO

## 2021-04-04 NOTE — DISCHARGE NOTE PROVIDER - NSDCMRMEDTOKEN_GEN_ALL_CORE_FT
acetaminophen 325 mg oral tablet: 2 tab(s) orally every 6 hours, As needed, Mild Pain (1 - 3)  Aspirin Enteric Coated 81 mg oral delayed release tablet: 1 tab(s) orally once a day  furosemide 20 mg oral tablet: 1 tab(s) orally once a day  Lasix 20 mg oral tablet: 1 tab(s) orally once a day  losartan 50 mg oral tablet: 1 tab(s) orally once a day  metoprolol succinate 25 mg oral tablet, extended release: 1 tab(s) orally once a day  omeprazole 20 mg oral delayed release capsule: 1 cap(s) orally once a day  potassium chloride 10 mEq oral tablet, extended release: 1 tab(s) orally once a day   acetaminophen 325 mg oral tablet: 2 tab(s) orally every 6 hours, As needed, Mild Pain (1 - 3)  amiodarone 200 mg oral tablet: 1 tab(s) orally once a day   amiodarone 400 mg oral tablet: 1 tab(s) orally 3 times a day   Aspirin Enteric Coated 81 mg oral delayed release tablet: 1 tab(s) orally once a day  furosemide 20 mg oral tablet: 1 tab(s) orally once a day  metoprolol succinate 25 mg oral tablet, extended release: 1 tab(s) orally once a day  omeprazole 20 mg oral delayed release capsule: 1 cap(s) orally once a day  potassium chloride 10 mEq oral tablet, extended release: 1 tab(s) orally once a day

## 2021-04-04 NOTE — DISCHARGE NOTE NURSING/CASE MANAGEMENT/SOCIAL WORK - PATIENT PORTAL LINK FT
You can access the FollowMyHealth Patient Portal offered by [facility] by registering at the following website: [email or web address]. By joining [facility]’s FollowMyHealth portal, you will also be able to view your health information using other applications (apps) compatible with our system.

## 2021-04-04 NOTE — DISCHARGE NOTE PROVIDER - CARE PROVIDERS DIRECT ADDRESSES
[email or web address]

## 2021-04-04 NOTE — PROGRESS NOTE ADULT - REASON FOR ADMISSION
Cardiac catheterization

## 2021-04-05 ENCOUNTER — NON-APPOINTMENT (OUTPATIENT)
Age: 81
End: 2021-04-05

## 2021-04-06 PROBLEM — I10 ESSENTIAL (PRIMARY) HYPERTENSION: Chronic | Status: ACTIVE | Noted: 2021-03-30

## 2021-04-06 PROBLEM — I34.0 NONRHEUMATIC MITRAL (VALVE) INSUFFICIENCY: Chronic | Status: ACTIVE | Noted: 2021-03-30

## 2021-04-06 PROBLEM — I34.1 NONRHEUMATIC MITRAL (VALVE) PROLAPSE: Chronic | Status: ACTIVE | Noted: 2021-03-30

## 2021-04-06 PROBLEM — E78.5 HYPERLIPIDEMIA, UNSPECIFIED: Chronic | Status: ACTIVE | Noted: 2021-03-30

## 2021-04-06 RX ORDER — LOSARTAN POTASSIUM 50 MG/1
50 TABLET, FILM COATED ORAL DAILY
Qty: 30 | Refills: 5 | Status: DISCONTINUED | COMMUNITY
Start: 2020-11-04 | End: 2021-04-06

## 2021-04-08 DIAGNOSIS — Z09 ENCOUNTER FOR FOLLOW-UP EXAMINATION AFTER COMPLETED TREATMENT FOR CONDITIONS OTHER THAN MALIGNANT NEOPLASM: ICD-10-CM

## 2021-04-08 DIAGNOSIS — Z87.898 PERSONAL HISTORY OF OTHER SPECIFIED CONDITIONS: ICD-10-CM

## 2021-04-08 DIAGNOSIS — Z01.818 ENCOUNTER FOR OTHER PREPROCEDURAL EXAMINATION: ICD-10-CM

## 2021-04-08 DIAGNOSIS — Z98.890 OTHER SPECIFIED POSTPROCEDURAL STATES: ICD-10-CM

## 2021-04-08 PROBLEM — Z87.74 S/P PATENT FORAMEN OVALE CLOSURE: Status: ACTIVE | Noted: 2021-04-08

## 2021-04-08 RX ORDER — FUROSEMIDE 20 MG/1
20 TABLET ORAL DAILY
Qty: 30 | Refills: 0 | Status: ACTIVE | COMMUNITY

## 2021-04-08 RX ORDER — FUROSEMIDE 40 MG/1
40 TABLET ORAL DAILY
Refills: 0 | Status: COMPLETED | COMMUNITY
End: 2021-04-08

## 2021-04-09 DIAGNOSIS — I48.91 UNSPECIFIED ATRIAL FIBRILLATION: ICD-10-CM

## 2021-04-09 DIAGNOSIS — R76.0 RAISED ANTIBODY TITER: ICD-10-CM

## 2021-04-09 DIAGNOSIS — I34.0 NONRHEUMATIC MITRAL (VALVE) INSUFFICIENCY: ICD-10-CM

## 2021-04-09 DIAGNOSIS — I11.0 HYPERTENSIVE HEART DISEASE WITH HEART FAILURE: ICD-10-CM

## 2021-04-09 DIAGNOSIS — I20.9 ANGINA PECTORIS, UNSPECIFIED: ICD-10-CM

## 2021-04-09 DIAGNOSIS — Z88.5 ALLERGY STATUS TO NARCOTIC AGENT: ICD-10-CM

## 2021-04-09 DIAGNOSIS — R00.1 BRADYCARDIA, UNSPECIFIED: ICD-10-CM

## 2021-04-09 DIAGNOSIS — Q21.1 ATRIAL SEPTAL DEFECT: ICD-10-CM

## 2021-04-09 DIAGNOSIS — Z79.82 LONG TERM (CURRENT) USE OF ASPIRIN: ICD-10-CM

## 2021-04-09 DIAGNOSIS — K21.9 GASTRO-ESOPHAGEAL REFLUX DISEASE WITHOUT ESOPHAGITIS: ICD-10-CM

## 2021-04-09 DIAGNOSIS — I50.9 HEART FAILURE, UNSPECIFIED: ICD-10-CM

## 2021-04-09 DIAGNOSIS — I49.1 ATRIAL PREMATURE DEPOLARIZATION: ICD-10-CM

## 2021-04-09 DIAGNOSIS — E78.5 HYPERLIPIDEMIA, UNSPECIFIED: ICD-10-CM

## 2021-04-09 DIAGNOSIS — E83.52 HYPERCALCEMIA: ICD-10-CM

## 2021-04-09 DIAGNOSIS — Z88.8 ALLERGY STATUS TO OTHER DRUGS, MEDICAMENTS AND BIOLOGICAL SUBSTANCES STATUS: ICD-10-CM

## 2021-04-09 DIAGNOSIS — I51.1 RUPTURE OF CHORDAE TENDINEAE, NOT ELSEWHERE CLASSIFIED: ICD-10-CM

## 2021-04-09 RX ORDER — AMIODARONE HYDROCHLORIDE 400 MG/1
1 TABLET ORAL
Qty: 30 | Refills: 0
Start: 2021-04-09 | End: 2021-05-08

## 2021-04-13 ENCOUNTER — APPOINTMENT (OUTPATIENT)
Dept: CARDIOTHORACIC SURGERY | Facility: CLINIC | Age: 81
End: 2021-04-13
Payer: COMMERCIAL

## 2021-04-13 ENCOUNTER — OUTPATIENT (OUTPATIENT)
Dept: OUTPATIENT SERVICES | Facility: HOSPITAL | Age: 81
LOS: 1 days | End: 2021-04-13
Payer: MEDICARE

## 2021-04-13 VITALS
DIASTOLIC BLOOD PRESSURE: 73 MMHG | HEART RATE: 78 BPM | RESPIRATION RATE: 18 BRPM | TEMPERATURE: 97.8 F | HEIGHT: 66 IN | WEIGHT: 166 LBS | SYSTOLIC BLOOD PRESSURE: 136 MMHG | OXYGEN SATURATION: 98 % | BODY MASS INDEX: 26.68 KG/M2

## 2021-04-13 DIAGNOSIS — Z87.74 PERSONAL HISTORY OF (CORRECTED) CONGENITAL MALFORMATIONS OF HEART AND CIRCULATORY SYSTEM: ICD-10-CM

## 2021-04-13 PROCEDURE — 99024 POSTOP FOLLOW-UP VISIT: CPT

## 2021-04-13 PROCEDURE — 71046 X-RAY EXAM CHEST 2 VIEWS: CPT

## 2021-04-13 PROCEDURE — 71046 X-RAY EXAM CHEST 2 VIEWS: CPT | Mod: 26

## 2021-05-04 ENCOUNTER — TRANSCRIPTION ENCOUNTER (OUTPATIENT)
Age: 81
End: 2021-05-04

## 2021-05-11 ENCOUNTER — OUTPATIENT (OUTPATIENT)
Dept: OUTPATIENT SERVICES | Facility: HOSPITAL | Age: 81
LOS: 1 days | End: 2021-05-11
Payer: MEDICARE

## 2021-05-11 ENCOUNTER — APPOINTMENT (OUTPATIENT)
Dept: CARDIOTHORACIC SURGERY | Facility: CLINIC | Age: 81
End: 2021-05-11
Payer: COMMERCIAL

## 2021-05-11 VITALS
RESPIRATION RATE: 18 BRPM | OXYGEN SATURATION: 95 % | HEIGHT: 66 IN | SYSTOLIC BLOOD PRESSURE: 160 MMHG | DIASTOLIC BLOOD PRESSURE: 73 MMHG | TEMPERATURE: 96.9 F | BODY MASS INDEX: 27 KG/M2 | WEIGHT: 168 LBS | HEART RATE: 56 BPM

## 2021-05-11 DIAGNOSIS — I34.0 NONRHEUMATIC MITRAL (VALVE) INSUFFICIENCY: ICD-10-CM

## 2021-05-11 LAB
ALBUMIN SERPL ELPH-MCNC: 3.8 G/DL — SIGNIFICANT CHANGE UP (ref 3.3–5)
ALP SERPL-CCNC: 111 U/L — SIGNIFICANT CHANGE UP (ref 40–120)
ALT FLD-CCNC: 17 U/L — SIGNIFICANT CHANGE UP (ref 10–45)
ANION GAP SERPL CALC-SCNC: 12 MMOL/L — SIGNIFICANT CHANGE UP (ref 5–17)
AST SERPL-CCNC: 23 U/L — SIGNIFICANT CHANGE UP (ref 10–40)
BASOPHILS # BLD AUTO: 0.06 K/UL — SIGNIFICANT CHANGE UP (ref 0–0.2)
BASOPHILS NFR BLD AUTO: 0.7 % — SIGNIFICANT CHANGE UP (ref 0–2)
BILIRUB SERPL-MCNC: 0.3 MG/DL — SIGNIFICANT CHANGE UP (ref 0.2–1.2)
BUN SERPL-MCNC: 15 MG/DL — SIGNIFICANT CHANGE UP (ref 7–23)
CALCIUM SERPL-MCNC: 9.5 MG/DL — SIGNIFICANT CHANGE UP (ref 8.4–10.5)
CHLORIDE SERPL-SCNC: 105 MMOL/L — SIGNIFICANT CHANGE UP (ref 96–108)
CO2 SERPL-SCNC: 29 MMOL/L — SIGNIFICANT CHANGE UP (ref 22–31)
CREAT SERPL-MCNC: 1.1 MG/DL — SIGNIFICANT CHANGE UP (ref 0.5–1.3)
EOSINOPHIL # BLD AUTO: 0.34 K/UL — SIGNIFICANT CHANGE UP (ref 0–0.5)
EOSINOPHIL NFR BLD AUTO: 3.9 % — SIGNIFICANT CHANGE UP (ref 0–6)
GLUCOSE SERPL-MCNC: 110 MG/DL — HIGH (ref 70–99)
HCT VFR BLD CALC: 39.9 % — SIGNIFICANT CHANGE UP (ref 34.5–45)
HGB BLD-MCNC: 12.4 G/DL — SIGNIFICANT CHANGE UP (ref 11.5–15.5)
IMM GRANULOCYTES NFR BLD AUTO: 0.2 % — SIGNIFICANT CHANGE UP (ref 0–1.5)
LYMPHOCYTES # BLD AUTO: 2.16 K/UL — SIGNIFICANT CHANGE UP (ref 1–3.3)
LYMPHOCYTES # BLD AUTO: 25 % — SIGNIFICANT CHANGE UP (ref 13–44)
MCHC RBC-ENTMCNC: 31.1 GM/DL — LOW (ref 32–36)
MCHC RBC-ENTMCNC: 31.8 PG — SIGNIFICANT CHANGE UP (ref 27–34)
MCV RBC AUTO: 102.3 FL — HIGH (ref 80–100)
MONOCYTES # BLD AUTO: 0.83 K/UL — SIGNIFICANT CHANGE UP (ref 0–0.9)
MONOCYTES NFR BLD AUTO: 9.6 % — SIGNIFICANT CHANGE UP (ref 2–14)
NEUTROPHILS # BLD AUTO: 5.22 K/UL — SIGNIFICANT CHANGE UP (ref 1.8–7.4)
NEUTROPHILS NFR BLD AUTO: 60.6 % — SIGNIFICANT CHANGE UP (ref 43–77)
NRBC # BLD: 0 /100 WBCS — SIGNIFICANT CHANGE UP (ref 0–0)
PLATELET # BLD AUTO: 293 K/UL — SIGNIFICANT CHANGE UP (ref 150–400)
POTASSIUM SERPL-MCNC: 4.7 MMOL/L — SIGNIFICANT CHANGE UP (ref 3.5–5.3)
POTASSIUM SERPL-SCNC: 4.7 MMOL/L — SIGNIFICANT CHANGE UP (ref 3.5–5.3)
PROT SERPL-MCNC: 6.7 G/DL — SIGNIFICANT CHANGE UP (ref 6–8.3)
RBC # BLD: 3.9 M/UL — SIGNIFICANT CHANGE UP (ref 3.8–5.2)
RBC # FLD: 13 % — SIGNIFICANT CHANGE UP (ref 10.3–14.5)
SODIUM SERPL-SCNC: 146 MMOL/L — HIGH (ref 135–145)
WBC # BLD: 8.63 K/UL — SIGNIFICANT CHANGE UP (ref 3.8–10.5)
WBC # FLD AUTO: 8.63 K/UL — SIGNIFICANT CHANGE UP (ref 3.8–10.5)

## 2021-05-11 PROCEDURE — 80053 COMPREHEN METABOLIC PANEL: CPT

## 2021-05-11 PROCEDURE — 99024 POSTOP FOLLOW-UP VISIT: CPT

## 2021-05-11 PROCEDURE — 36415 COLL VENOUS BLD VENIPUNCTURE: CPT

## 2021-05-11 PROCEDURE — 85025 COMPLETE CBC W/AUTO DIFF WBC: CPT

## 2021-05-11 RX ORDER — OMEPRAZOLE 20 MG/1
20 CAPSULE, DELAYED RELEASE ORAL DAILY
Qty: 1 | Refills: 0 | Status: ACTIVE | COMMUNITY
Start: 2020-11-04 | End: 1900-01-01

## 2021-05-11 RX ORDER — AMIODARONE HYDROCHLORIDE 200 MG/1
200 TABLET ORAL DAILY
Qty: 30 | Refills: 1 | Status: DISCONTINUED | COMMUNITY
Start: 2021-04-06 | End: 2021-05-11

## 2021-05-11 RX ORDER — METOPROLOL SUCCINATE 25 MG/1
25 TABLET, EXTENDED RELEASE ORAL DAILY
Refills: 3 | Status: DISCONTINUED | COMMUNITY
Start: 2021-04-06 | End: 2021-05-11

## 2021-05-11 RX ORDER — LOSARTAN POTASSIUM 50 MG/1
50 TABLET, FILM COATED ORAL DAILY
Qty: 30 | Refills: 1 | Status: ACTIVE | COMMUNITY
Start: 1900-01-01 | End: 1900-01-01

## 2021-05-19 RX ORDER — POTASSIUM CHLORIDE 750 MG/1
10 TABLET, FILM COATED, EXTENDED RELEASE ORAL DAILY
Qty: 30 | Refills: 1 | Status: ACTIVE | COMMUNITY
Start: 2021-04-06 | End: 1900-01-01

## 2021-08-03 ENCOUNTER — APPOINTMENT (OUTPATIENT)
Dept: HEART AND VASCULAR | Facility: CLINIC | Age: 81
End: 2021-08-03
Payer: MEDICARE

## 2021-08-03 PROCEDURE — 93248 EXT ECG>7D<15D REV&INTERPJ: CPT

## 2021-11-23 ENCOUNTER — APPOINTMENT (OUTPATIENT)
Dept: DERMATOLOGY | Facility: CLINIC | Age: 81
End: 2021-11-23

## 2021-12-20 ENCOUNTER — INPATIENT (INPATIENT)
Facility: HOSPITAL | Age: 81
LOS: 0 days | Discharge: HOME | DRG: 262 | End: 2021-12-21
Attending: INTERNAL MEDICINE | Admitting: INTERNAL MEDICINE
Payer: MEDICARE

## 2021-12-20 VITALS
DIASTOLIC BLOOD PRESSURE: 77 MMHG | RESPIRATION RATE: 18 BRPM | OXYGEN SATURATION: 97 % | SYSTOLIC BLOOD PRESSURE: 145 MMHG | TEMPERATURE: 98 F | WEIGHT: 164.91 LBS | HEIGHT: 66 IN | HEART RATE: 122 BPM

## 2021-12-20 DIAGNOSIS — Q21.1 ATRIAL SEPTAL DEFECT: ICD-10-CM

## 2021-12-20 DIAGNOSIS — Z98.890 OTHER SPECIFIED POSTPROCEDURAL STATES: ICD-10-CM

## 2021-12-20 DIAGNOSIS — I89.0 LYMPHEDEMA, NOT ELSEWHERE CLASSIFIED: ICD-10-CM

## 2021-12-20 DIAGNOSIS — Z98.890 OTHER SPECIFIED POSTPROCEDURAL STATES: Chronic | ICD-10-CM

## 2021-12-20 DIAGNOSIS — I34.0 NONRHEUMATIC MITRAL (VALVE) INSUFFICIENCY: ICD-10-CM

## 2021-12-20 DIAGNOSIS — I10 ESSENTIAL (PRIMARY) HYPERTENSION: ICD-10-CM

## 2021-12-20 DIAGNOSIS — Q21.1 ATRIAL SEPTAL DEFECT: Chronic | ICD-10-CM

## 2021-12-20 DIAGNOSIS — R60.0 LOCALIZED EDEMA: ICD-10-CM

## 2021-12-20 DIAGNOSIS — I48.0 PAROXYSMAL ATRIAL FIBRILLATION: ICD-10-CM

## 2021-12-20 DIAGNOSIS — K21.9 GASTRO-ESOPHAGEAL REFLUX DISEASE WITHOUT ESOPHAGITIS: ICD-10-CM

## 2021-12-20 DIAGNOSIS — Z29.9 ENCOUNTER FOR PROPHYLACTIC MEASURES, UNSPECIFIED: ICD-10-CM

## 2021-12-20 DIAGNOSIS — E78.5 HYPERLIPIDEMIA, UNSPECIFIED: ICD-10-CM

## 2021-12-20 DIAGNOSIS — I47.1 SUPRAVENTRICULAR TACHYCARDIA: ICD-10-CM

## 2021-12-20 DIAGNOSIS — I25.10 ATHEROSCLEROTIC HEART DISEASE OF NATIVE CORONARY ARTERY WITHOUT ANGINA PECTORIS: ICD-10-CM

## 2021-12-20 DIAGNOSIS — Z79.82 LONG TERM (CURRENT) USE OF ASPIRIN: ICD-10-CM

## 2021-12-20 LAB
ALBUMIN SERPL ELPH-MCNC: 3.6 G/DL — SIGNIFICANT CHANGE UP (ref 3.3–5)
ALBUMIN SERPL ELPH-MCNC: 4.1 G/DL — SIGNIFICANT CHANGE UP (ref 3.3–5)
ALP SERPL-CCNC: 61 U/L — SIGNIFICANT CHANGE UP (ref 40–120)
ALP SERPL-CCNC: 74 U/L — SIGNIFICANT CHANGE UP (ref 40–120)
ALT FLD-CCNC: 12 U/L — SIGNIFICANT CHANGE UP (ref 10–45)
ALT FLD-CCNC: 14 U/L — SIGNIFICANT CHANGE UP (ref 10–45)
ANION GAP SERPL CALC-SCNC: 10 MMOL/L — SIGNIFICANT CHANGE UP (ref 5–17)
ANION GAP SERPL CALC-SCNC: 14 MMOL/L — SIGNIFICANT CHANGE UP (ref 5–17)
APPEARANCE UR: CLEAR — SIGNIFICANT CHANGE UP
APTT BLD: 26.9 SEC — LOW (ref 27.5–35.5)
APTT BLD: 35.1 SEC — SIGNIFICANT CHANGE UP (ref 27.5–35.5)
AST SERPL-CCNC: 19 U/L — SIGNIFICANT CHANGE UP (ref 10–40)
AST SERPL-CCNC: 24 U/L — SIGNIFICANT CHANGE UP (ref 10–40)
BASOPHILS # BLD AUTO: 0.04 K/UL — SIGNIFICANT CHANGE UP (ref 0–0.2)
BASOPHILS # BLD AUTO: 0.06 K/UL — SIGNIFICANT CHANGE UP (ref 0–0.2)
BASOPHILS NFR BLD AUTO: 0.5 % — SIGNIFICANT CHANGE UP (ref 0–2)
BASOPHILS NFR BLD AUTO: 0.8 % — SIGNIFICANT CHANGE UP (ref 0–2)
BILIRUB DIRECT SERPL-MCNC: 0.2 MG/DL — SIGNIFICANT CHANGE UP (ref 0–0.3)
BILIRUB INDIRECT FLD-MCNC: 0.2 MG/DL — SIGNIFICANT CHANGE UP (ref 0.2–1)
BILIRUB SERPL-MCNC: 0.2 MG/DL — SIGNIFICANT CHANGE UP (ref 0.2–1.2)
BILIRUB SERPL-MCNC: 0.4 MG/DL — SIGNIFICANT CHANGE UP (ref 0.2–1.2)
BILIRUB UR-MCNC: NEGATIVE — SIGNIFICANT CHANGE UP
BUN SERPL-MCNC: 17 MG/DL — SIGNIFICANT CHANGE UP (ref 7–23)
BUN SERPL-MCNC: 20 MG/DL — SIGNIFICANT CHANGE UP (ref 7–23)
CALCIUM SERPL-MCNC: 8.7 MG/DL — SIGNIFICANT CHANGE UP (ref 8.4–10.5)
CALCIUM SERPL-MCNC: 8.8 MG/DL — SIGNIFICANT CHANGE UP (ref 8.4–10.5)
CHLORIDE SERPL-SCNC: 108 MMOL/L — SIGNIFICANT CHANGE UP (ref 96–108)
CHLORIDE SERPL-SCNC: 109 MMOL/L — HIGH (ref 96–108)
CHOLEST SERPL-MCNC: 171 MG/DL — SIGNIFICANT CHANGE UP
CK MB CFR SERPL CALC: 2.6 NG/ML — SIGNIFICANT CHANGE UP (ref 0–6.7)
CK SERPL-CCNC: 51 U/L — SIGNIFICANT CHANGE UP (ref 25–170)
CO2 SERPL-SCNC: 23 MMOL/L — SIGNIFICANT CHANGE UP (ref 22–31)
CO2 SERPL-SCNC: 26 MMOL/L — SIGNIFICANT CHANGE UP (ref 22–31)
COLOR SPEC: YELLOW — SIGNIFICANT CHANGE UP
CREAT SERPL-MCNC: 0.98 MG/DL — SIGNIFICANT CHANGE UP (ref 0.5–1.3)
CREAT SERPL-MCNC: 1.12 MG/DL — SIGNIFICANT CHANGE UP (ref 0.5–1.3)
DIFF PNL FLD: NEGATIVE — SIGNIFICANT CHANGE UP
EOSINOPHIL # BLD AUTO: 0.21 K/UL — SIGNIFICANT CHANGE UP (ref 0–0.5)
EOSINOPHIL # BLD AUTO: 0.23 K/UL — SIGNIFICANT CHANGE UP (ref 0–0.5)
EOSINOPHIL NFR BLD AUTO: 2.4 % — SIGNIFICANT CHANGE UP (ref 0–6)
EOSINOPHIL NFR BLD AUTO: 3 % — SIGNIFICANT CHANGE UP (ref 0–6)
GLUCOSE SERPL-MCNC: 117 MG/DL — HIGH (ref 70–99)
GLUCOSE SERPL-MCNC: 98 MG/DL — SIGNIFICANT CHANGE UP (ref 70–99)
GLUCOSE UR QL: NEGATIVE — SIGNIFICANT CHANGE UP
HCT VFR BLD CALC: 39.3 % — SIGNIFICANT CHANGE UP (ref 34.5–45)
HCT VFR BLD CALC: 39.6 % — SIGNIFICANT CHANGE UP (ref 34.5–45)
HDLC SERPL-MCNC: 78 MG/DL — SIGNIFICANT CHANGE UP
HGB BLD-MCNC: 13 G/DL — SIGNIFICANT CHANGE UP (ref 11.5–15.5)
HGB BLD-MCNC: 13.3 G/DL — SIGNIFICANT CHANGE UP (ref 11.5–15.5)
IMM GRANULOCYTES NFR BLD AUTO: 0.2 % — SIGNIFICANT CHANGE UP (ref 0–1.5)
IMM GRANULOCYTES NFR BLD AUTO: 0.3 % — SIGNIFICANT CHANGE UP (ref 0–1.5)
INR BLD: 0.93 — SIGNIFICANT CHANGE UP (ref 0.88–1.16)
INR BLD: 0.99 — SIGNIFICANT CHANGE UP (ref 0.88–1.16)
KETONES UR-MCNC: ABNORMAL MG/DL
LEUKOCYTE ESTERASE UR-ACNC: NEGATIVE — SIGNIFICANT CHANGE UP
LIDOCAIN IGE QN: 35 U/L — SIGNIFICANT CHANGE UP (ref 7–60)
LIPID PNL WITH DIRECT LDL SERPL: 71 MG/DL — SIGNIFICANT CHANGE UP
LYMPHOCYTES # BLD AUTO: 2.22 K/UL — SIGNIFICANT CHANGE UP (ref 1–3.3)
LYMPHOCYTES # BLD AUTO: 2.79 K/UL — SIGNIFICANT CHANGE UP (ref 1–3.3)
LYMPHOCYTES # BLD AUTO: 25.8 % — SIGNIFICANT CHANGE UP (ref 13–44)
LYMPHOCYTES # BLD AUTO: 36.2 % — SIGNIFICANT CHANGE UP (ref 13–44)
MAGNESIUM SERPL-MCNC: 1.8 MG/DL — SIGNIFICANT CHANGE UP (ref 1.6–2.6)
MAGNESIUM SERPL-MCNC: 1.9 MG/DL — SIGNIFICANT CHANGE UP (ref 1.6–2.6)
MCHC RBC-ENTMCNC: 33.1 GM/DL — SIGNIFICANT CHANGE UP (ref 32–36)
MCHC RBC-ENTMCNC: 33.6 GM/DL — SIGNIFICANT CHANGE UP (ref 32–36)
MCHC RBC-ENTMCNC: 35.1 PG — HIGH (ref 27–34)
MCHC RBC-ENTMCNC: 35.6 PG — HIGH (ref 27–34)
MCV RBC AUTO: 105.9 FL — HIGH (ref 80–100)
MCV RBC AUTO: 106.2 FL — HIGH (ref 80–100)
MONOCYTES # BLD AUTO: 0.65 K/UL — SIGNIFICANT CHANGE UP (ref 0–0.9)
MONOCYTES # BLD AUTO: 0.87 K/UL — SIGNIFICANT CHANGE UP (ref 0–0.9)
MONOCYTES NFR BLD AUTO: 10.1 % — SIGNIFICANT CHANGE UP (ref 2–14)
MONOCYTES NFR BLD AUTO: 8.4 % — SIGNIFICANT CHANGE UP (ref 2–14)
NEUTROPHILS # BLD AUTO: 3.95 K/UL — SIGNIFICANT CHANGE UP (ref 1.8–7.4)
NEUTROPHILS # BLD AUTO: 5.24 K/UL — SIGNIFICANT CHANGE UP (ref 1.8–7.4)
NEUTROPHILS NFR BLD AUTO: 51.3 % — SIGNIFICANT CHANGE UP (ref 43–77)
NEUTROPHILS NFR BLD AUTO: 61 % — SIGNIFICANT CHANGE UP (ref 43–77)
NITRITE UR-MCNC: NEGATIVE — SIGNIFICANT CHANGE UP
NON HDL CHOLESTEROL: 93 MG/DL — SIGNIFICANT CHANGE UP
NRBC # BLD: 0 /100 WBCS — SIGNIFICANT CHANGE UP (ref 0–0)
NRBC # BLD: 0 /100 WBCS — SIGNIFICANT CHANGE UP (ref 0–0)
NT-PROBNP SERPL-SCNC: 852 PG/ML — HIGH (ref 0–300)
NT-PROBNP SERPL-SCNC: 924 PG/ML — HIGH (ref 0–300)
PH UR: 5.5 — SIGNIFICANT CHANGE UP (ref 5–8)
PLATELET # BLD AUTO: 206 K/UL — SIGNIFICANT CHANGE UP (ref 150–400)
PLATELET # BLD AUTO: 220 K/UL — SIGNIFICANT CHANGE UP (ref 150–400)
POTASSIUM SERPL-MCNC: 3.5 MMOL/L — SIGNIFICANT CHANGE UP (ref 3.5–5.3)
POTASSIUM SERPL-MCNC: 3.8 MMOL/L — SIGNIFICANT CHANGE UP (ref 3.5–5.3)
POTASSIUM SERPL-SCNC: 3.5 MMOL/L — SIGNIFICANT CHANGE UP (ref 3.5–5.3)
POTASSIUM SERPL-SCNC: 3.8 MMOL/L — SIGNIFICANT CHANGE UP (ref 3.5–5.3)
PROT SERPL-MCNC: 6 G/DL — SIGNIFICANT CHANGE UP (ref 6–8.3)
PROT SERPL-MCNC: 6.7 G/DL — SIGNIFICANT CHANGE UP (ref 6–8.3)
PROT UR-MCNC: NEGATIVE MG/DL — SIGNIFICANT CHANGE UP
PROTHROM AB SERPL-ACNC: 11.2 SEC — SIGNIFICANT CHANGE UP (ref 10.6–13.6)
PROTHROM AB SERPL-ACNC: 11.9 SEC — SIGNIFICANT CHANGE UP (ref 10.6–13.6)
RBC # BLD: 3.7 M/UL — LOW (ref 3.8–5.2)
RBC # BLD: 3.74 M/UL — LOW (ref 3.8–5.2)
RBC # FLD: 13.2 % — SIGNIFICANT CHANGE UP (ref 10.3–14.5)
RBC # FLD: 13.2 % — SIGNIFICANT CHANGE UP (ref 10.3–14.5)
SARS-COV-2 RNA SPEC QL NAA+PROBE: NEGATIVE — SIGNIFICANT CHANGE UP
SODIUM SERPL-SCNC: 144 MMOL/L — SIGNIFICANT CHANGE UP (ref 135–145)
SODIUM SERPL-SCNC: 146 MMOL/L — HIGH (ref 135–145)
SP GR SPEC: 1.02 — SIGNIFICANT CHANGE UP (ref 1–1.03)
TRIGL SERPL-MCNC: 112 MG/DL — SIGNIFICANT CHANGE UP
TROPONIN T SERPL-MCNC: 0.01 NG/ML — SIGNIFICANT CHANGE UP (ref 0–0.01)
TROPONIN T SERPL-MCNC: 0.01 NG/ML — SIGNIFICANT CHANGE UP (ref 0–0.01)
TSH SERPL-MCNC: 2.01 UIU/ML — SIGNIFICANT CHANGE UP (ref 0.27–4.2)
UROBILINOGEN FLD QL: 0.2 E.U./DL — SIGNIFICANT CHANGE UP
WBC # BLD: 7.7 K/UL — SIGNIFICANT CHANGE UP (ref 3.8–10.5)
WBC # BLD: 8.6 K/UL — SIGNIFICANT CHANGE UP (ref 3.8–10.5)
WBC # FLD AUTO: 7.7 K/UL — SIGNIFICANT CHANGE UP (ref 3.8–10.5)
WBC # FLD AUTO: 8.6 K/UL — SIGNIFICANT CHANGE UP (ref 3.8–10.5)

## 2021-12-20 PROCEDURE — 93010 ELECTROCARDIOGRAM REPORT: CPT

## 2021-12-20 PROCEDURE — 99222 1ST HOSP IP/OBS MODERATE 55: CPT

## 2021-12-20 PROCEDURE — 99285 EMERGENCY DEPT VISIT HI MDM: CPT

## 2021-12-20 PROCEDURE — 93306 TTE W/DOPPLER COMPLETE: CPT | Mod: 26

## 2021-12-20 PROCEDURE — 71045 X-RAY EXAM CHEST 1 VIEW: CPT | Mod: 26

## 2021-12-20 RX ORDER — APIXABAN 2.5 MG/1
5 TABLET, FILM COATED ORAL EVERY 12 HOURS
Refills: 0 | Status: DISCONTINUED | OUTPATIENT
Start: 2021-12-20 | End: 2021-12-20

## 2021-12-20 RX ORDER — POTASSIUM CHLORIDE 20 MEQ
40 PACKET (EA) ORAL ONCE
Refills: 0 | Status: COMPLETED | OUTPATIENT
Start: 2021-12-20 | End: 2021-12-20

## 2021-12-20 RX ORDER — METOPROLOL TARTRATE 50 MG
25 TABLET ORAL
Refills: 0 | Status: DISCONTINUED | OUTPATIENT
Start: 2021-12-21 | End: 2021-12-21

## 2021-12-20 RX ORDER — ASPIRIN/CALCIUM CARB/MAGNESIUM 324 MG
81 TABLET ORAL DAILY
Refills: 0 | Status: DISCONTINUED | OUTPATIENT
Start: 2021-12-20 | End: 2021-12-21

## 2021-12-20 RX ORDER — FUROSEMIDE 40 MG
20 TABLET ORAL DAILY
Refills: 0 | Status: DISCONTINUED | OUTPATIENT
Start: 2021-12-20 | End: 2021-12-21

## 2021-12-20 RX ORDER — LOSARTAN POTASSIUM 100 MG/1
50 TABLET, FILM COATED ORAL DAILY
Refills: 0 | Status: DISCONTINUED | OUTPATIENT
Start: 2021-12-20 | End: 2021-12-21

## 2021-12-20 RX ORDER — METOPROLOL TARTRATE 50 MG
25 TABLET ORAL
Refills: 0 | Status: DISCONTINUED | OUTPATIENT
Start: 2021-12-20 | End: 2021-12-20

## 2021-12-20 RX ORDER — ASPIRIN/CALCIUM CARB/MAGNESIUM 324 MG
1 TABLET ORAL
Qty: 0 | Refills: 0 | DISCHARGE

## 2021-12-20 RX ORDER — METOPROLOL TARTRATE 50 MG
1 TABLET ORAL
Qty: 0 | Refills: 0 | DISCHARGE

## 2021-12-20 RX ORDER — PANTOPRAZOLE SODIUM 20 MG/1
40 TABLET, DELAYED RELEASE ORAL
Refills: 0 | Status: DISCONTINUED | OUTPATIENT
Start: 2021-12-20 | End: 2021-12-21

## 2021-12-20 RX ORDER — ENOXAPARIN SODIUM 100 MG/ML
75 INJECTION SUBCUTANEOUS ONCE
Refills: 0 | Status: COMPLETED | OUTPATIENT
Start: 2021-12-20 | End: 2021-12-20

## 2021-12-20 RX ORDER — MAGNESIUM OXIDE 400 MG ORAL TABLET 241.3 MG
400 TABLET ORAL ONCE
Refills: 0 | Status: COMPLETED | OUTPATIENT
Start: 2021-12-20 | End: 2021-12-20

## 2021-12-20 RX ORDER — METOPROLOL TARTRATE 50 MG
50 TABLET ORAL ONCE
Refills: 0 | Status: COMPLETED | OUTPATIENT
Start: 2021-12-20 | End: 2021-12-20

## 2021-12-20 RX ADMIN — ENOXAPARIN SODIUM 75 MILLIGRAM(S): 100 INJECTION SUBCUTANEOUS at 02:32

## 2021-12-20 RX ADMIN — Medication 25 MILLIGRAM(S): at 05:59

## 2021-12-20 RX ADMIN — LOSARTAN POTASSIUM 50 MILLIGRAM(S): 100 TABLET, FILM COATED ORAL at 05:58

## 2021-12-20 RX ADMIN — PANTOPRAZOLE SODIUM 40 MILLIGRAM(S): 20 TABLET, DELAYED RELEASE ORAL at 05:59

## 2021-12-20 RX ADMIN — Medication 20 MILLIGRAM(S): at 05:59

## 2021-12-20 RX ADMIN — Medication 25 MILLIGRAM(S): at 17:20

## 2021-12-20 RX ADMIN — MAGNESIUM OXIDE 400 MG ORAL TABLET 400 MILLIGRAM(S): 241.3 TABLET ORAL at 11:02

## 2021-12-20 RX ADMIN — Medication 81 MILLIGRAM(S): at 11:02

## 2021-12-20 RX ADMIN — Medication 50 MILLIGRAM(S): at 01:50

## 2021-12-20 RX ADMIN — Medication 40 MILLIEQUIVALENT(S): at 11:02

## 2021-12-20 NOTE — H&P ADULT - NSHPREVIEWOFSYSTEMS_GEN_ALL_CORE
GENERAL, CONSTITUTIONAL : denies recent weight loss, fever, chills  EYES, VISION: denies changes in vision   EARS, NOSE, THROAT: denies hearing loss  HEART, CARDIOVASCULAR: denies chest pain, admits to palpitations,   RESPIRATORY: Admits to dyspnea, Denies cough, wheezing, PND, orthopnea  GASTROINTESTINAL: Denies abdominal pain, heartburn, bloody stool, dark tarry stool  GENITOURINARY: Denies frequent urination, urgency  MUSCULOSKELETAL denies joint pain or swelling, restricted motion, musculoskeletal pain.   SKIN & INTEGUMENTARY Denies rashes, sores, blisters, blisters, growths.  NEUROLOGICAL: admits to dizziness, Denies numbness or tingling sensations, sensation loss, burning.   PSYCHIATRIC: Denies nervousness, anxiety, depression  ENDOCRINE Denies heat or cold intolerance, excessive thirst  HEMATOLOGIC/LYMPHATIC: Denies abnormal bleeding, bleeding of any kind

## 2021-12-20 NOTE — PROGRESS NOTE ADULT - PROBLEM SELECTOR PLAN 3
Hx of severe MR s/p Mitral Valve Repair and PFO closure via right mini thoracotomy w/ Dr Wilcox 3/31/21 @[facility].  -Echo reveals mild-moderate MR, Mitral annuloplasty ring, mean transvalvular gradient 3.3mm w/ HR 73bpm  -Stable, will monitor

## 2021-12-20 NOTE — H&P ADULT - HISTORY OF PRESENT ILLNESS
A&O X3 Full Code    In terms of COVID-19 pt. denies hx of COVID-19 and has received full COVID-19 vaccination      80 y/o female with PMHx of HTN, HLD, non obstructive CAD with normal EF on 3/30/21 @[facility] (CTS clearance) hx of severe MR s/p Mitral Valve Repair and PFO closure via right mini thoracotomy on 3/31/21 @[facility] with post op day two episode of Atrial Fibrillation in which patient was treated with Amiodarone and BB converting back to NSR presents to [facility] ER this morning, 12/20/21, complaining of dyspnea, b/l LE edema over the past few days with this morning palpitations and dizziness.  In ER  pt. was noted to be in rapid Atrial Fibrillation, 's.     In speaking with patient, she reports she woke up yesterday evening, 12/19/21, 10PM, with palpitations and dizziness lasting four hours.  She also reports intermittent dyspnea with b/l LE edema for the past few days.  Pt. reports compliance with medication and medical followups.      In ER ECG reveals Atrial Fibrillation , however, on telemetry, pt's HR went up to 140's in which she was given both Metoprolol Tart 50mg PO X1 and Lovenox 75mg SQ X1.   A&O X3 Full Code    In terms of COVID-19 pt. denies hx of COVID-19 and has received full COVID-19 vaccination      80 y/o female with PMHx of HTN, HLD, non obstructive CAD with normal EF on 3/30/21 @[facility] (CTS clearance) hx of severe MR s/p Mitral Valve Repair and PFO closure via right mini thoracotomy on 3/31/21 @[facility] with post op day two episode of Atrial Fibrillation in which patient was treated with Amiodarone and BB converting back to NSR presents to [facility] ER this morning, 12/20/21, complaining of dyspnea, b/l LE edema over the past few days with this morning palpitations and dizziness.  In ER  pt. was noted to be in rapid Atrial Fibrillation, 's.     In speaking with patient, she reports she woke up yesterday evening, 12/19/21, 10PM, with palpitations and dizziness lasting four hours.  She also reports intermittent dyspnea with b/l LE edema for the past few days.  Pt. reports compliance with medication and medical followups.      In ER ECG reveals Atrial Fibrillation , however, on telemetry, pt's HR went up to 140's in which she was given both Metoprolol Tart 50mg PO X1 and Lovenox 75mg SQ X1.  Labs Troponin neg X1, ,  H/H 13.3/39.6, Blood Glucose 117, Mg 1.9 (replenished Mag Oxide 400mg PO X1.      In light of patient's risk factors and Atrial Fibrillation RvR pt. is admitted to Cardiology, [facility], for AFib management, EP consultation with possible SARAH/DCCV. A&O X3 Full Code    In terms of COVID-19 pt. had hx of COVID-19 Sep 2021 requiring no hospitalization and has received full Pfizer COVID-19 vaccination plus Pfizer booster.      80 y/o female with PMHx of HTN, HLD, non obstructive CAD with normal EF on 3/30/21 @[facility] (CTS clearance) hx of severe MR s/p Mitral Valve Repair and PFO closure via right mini thoracotomy on 3/31/21 @[facility] with post op day two episode of Atrial Fibrillation in which patient was treated with Amiodarone and BB converting back to NSR (pt. was d/c Amiodarone in May 2021) presents to [facility] ER this morning, 12/20/21, complaining of dyspnea, b/l LE edema over the past few days with this morning palpitations and dizziness.  In ER  pt. was noted to be in rapid Atrial Fibrillation, 's.     In speaking with patient, she reports she woke up yesterday evening, 12/19/21, 10PM, with palpitations and dizziness lasting four hours.  She also reports intermittent dyspnea with b/l LE edema for the past few days.  Pt. reports compliance with medication and medical followups.      In ER ECG reveals Atrial Fibrillation , however, on telemetry, pt's HR went up to 140's in which she was given both Metoprolol Tart 50mg PO X1 and Lovenox 75mg SQ X1 (2AM).  Labs Troponin neg X1, ,  H/H 13.3/39.6, Blood Glucose 117, Mg 1.9 (replenished Mag Oxide 400mg PO X1.      In light of patient's risk factors and Atrial Fibrillation RvR pt. is admitted to Cardiology, [facility], for AFib management, EP consultation with possible SARAH/DCCV. A&O X3 Full Code    In terms of COVID-19 pt. had hx of COVID-19 Sep 2021 requiring no hospitalization and has received full Pfizer COVID-19 vaccination plus Pfizer booster.      80 y/o female with PMHx of HTN, HLD, non obstructive CAD with normal EF on 3/30/21 @[facility] (CTS clearance) hx of severe MR s/p Mitral Valve Repair and PFO closure via right mini thoracotomy on 3/31/21 @[facility] with post op day two episode of Atrial Fibrillation in which patient was treated with Amiodarone and BB converting back to NSR (pt. was d/c Amiodarone in May 2021) presents to [facility] ER this morning, 12/20/21, complaining of dyspnea, b/l LE edema over the past few days with this morning palpitations and dizziness.  In ER  pt. was noted to be in rapid Atrial Fibrillation, 's.     In speaking with patient, she reports she woke up yesterday evening, 12/19/21, 10PM, with palpitations and dizziness lasting four hours.  She also reports intermittent dyspnea with b/l LE edema for the past few days.  Pt. reports compliance with medication and medical followups.  Pt. denies fever, chills, HA, cough, chest pain, SOB, dizziness, palpitations, abdominal discomfort and n/v/d.    In ER ECG reveals Atrial Fibrillation , however, on telemetry, pt's HR went up to 140's in which she was given both Metoprolol Tart 50mg PO X1 and Lovenox 75mg SQ X1 (2AM).  Labs Troponin neg X1, ,  H/H 13.3/39.6, Blood Glucose 117, Mg 1.9 (replenished Mag Oxide 400mg PO X1.      In light of patient's risk factors and Atrial Fibrillation RvR pt. is admitted to Cardiology, [facility], for AFib management, EP consultation with possible SARAH/DCCV.

## 2021-12-20 NOTE — H&P ADULT - NSHPPHYSICALEXAM_GEN_ALL_CORE
T(C): 36.4 (12-20-21 @ 00:55), Max: 36.4 (12-20-21 @ 00:55)  HR: 122 (12-20-21 @ 00:55) (122 - 122)  BP: 145/77 (12-20-21 @ 00:55) (145/77 - 145/77)  RR: 18 (12-20-21 @ 00:55) (18 - 18)  SpO2: 97% (12-20-21 @ 00:55) (97% - 97%)  Wt(kg): --    Appearance: Normal	  HEENT:   Normal oral mucosa, PERRL, EOMI	  Neck: Supple,  - JVD; No Carotid Bruit and 2+ pulses B/L  Cardiovascular: Normal S1 S2, No JVD, No murmurs  Respiratory: Lungs clear to auscultation//No Rales, Rhonchi, Wheezing	  Gastrointestinal:  Soft, Non-tender, + BS	  Skin: No rashes, No ecchymoses, No cyanosis  Extremities: Normal range of motion, No clubbing, cyanosis or edema  Vascular: Femoral pulses 2+ b/l without bruit, DP 1+ b/l, PT 1+ b/l  Neurologic: Non-focal  Psychiatry: A & O x 3, Mood & affect appropriate

## 2021-12-20 NOTE — PROGRESS NOTE ADULT - PROBLEM SELECTOR PLAN 5
Pt. takes Omeprazole 40mg PO daily.  - Continue Protonix 40mg PO daily    F: No IVF  N: DASH/TLC diet  E: Replete lytes PRN K<4, Mg<2  P: DVT PPX: on HSQ  C: FULL CODE  Dispo: Admit to tele [facility]

## 2021-12-20 NOTE — H&P ADULT - NSHPLABSRESULTS_GEN_ALL_CORE
13.3   8.60  )-----------( 220      ( 20 Dec 2021 01:36 )             39.6       12-20    144  |  108  |  20  ----------------------------<  117<H>  3.8   |  26  |  1.12    Ca    8.8      20 Dec 2021 01:36  Mg     1.9     12-20    TPro  6.7  /  Alb  4.1  /  TBili  0.2  /  DBili  x   /  AST  24  /  ALT  14  /  AlkPhos  74  12-20      PT/INR - ( 20 Dec 2021 01:36 )   PT: 11.2 sec;   INR: 0.93          PTT - ( 20 Dec 2021 01:36 )  PTT:26.9 sec    CARDIAC MARKERS ( 20 Dec 2021 01:36 )  x     / 0.01 ng/mL / x     / x     / x                EKG: new Afib 103bpm with non specific ST-T wave changes

## 2021-12-20 NOTE — H&P ADULT - PROBLEM SELECTOR PLAN 1
ER on Telemetry 's, pt. was given Metoprolol tart 50mg PO X1 and Lovenox 75mg SQ X1 2AM    Currently Rate control Atrial Fib HR 80's.  Continue Metoprolol Tart 25mg PO bid.   Consider starting Eliquis 5mg PO bid (would start at 2PM, pt. received Lovenox 75mg SQ at 2AM)  EP consult for possible SARAH/DCCV.  Echocardiogram ordered f/u

## 2021-12-20 NOTE — H&P ADULT - ASSESSMENT
80 y/o female with PMHx of HTN, HLD, non obstructive CAD with normal EF on 3/30/21 @[facility] (CTS clearance) hx of severe MR s/p Mitral Valve Repair and PFO closure via right mini thoracotomy on 3/31/21 @[facility] with post op day two episode of Atrial Fibrillation in which patient was treated with Amiodarone and BB converting back to NSR presents to [facility] ER this morning, 12/20/21, complaining of dyspnea, b/l LE edema over the past few days with this morning palpitations and dizziness.  In ER  pt. was noted to be in rapid Atrial Fibrillation, 's.

## 2021-12-20 NOTE — ED PROVIDER NOTE - CLINICAL SUMMARY MEDICAL DECISION MAKING FREE TEXT BOX
82 yo F with past medical history of hypertension (Metoprolol, Losartan), GERD (omeprazole), PSH of benign breast cyst removal, ankle surgery and hammer toe surgery, Mitral valve prolapse, and severe Mitral regurgitation, s/p mitral valve repair and PFO closure on 3/31/21 (9 months ago) with Dr. Wilcox (underwent cardiac catheterization at that time which showed no significant coronary artery disease) with intraoperative transesophageal echocardiogram that showed normal left ventricular function presents today with worsening of her chronic shortness of breath and leg swelling over the past few days with 5 episodes of palpitations and dizziness described as "vertigo" in the past 4 hours. Denies chest pain, focal weakness, numbness. No other complaints.  ED course: VS noted. Pt tachycardic and afebrile. ECG with sinus tachycardia. CXR with NAD. While in ED pt noted to have episodes of paroxysmal afib with VR in 170s. Lopressor po given and pt also given Lovenox. Case discussed with Cardiology fellow and pt admitted to cardiology. 5 [facility] SMITH aware. Findings and plan discussed with pt.

## 2021-12-20 NOTE — ED PROVIDER NOTE - PROGRESS NOTE DETAILS
While in ED pt with pt jayla arteaga; afob shotonh to 1702 While in ED pt with episodes of paroxysmal afib with VR in 150s/ 170s- broke on its own.

## 2021-12-20 NOTE — CONSULT NOTE ADULT - SUBJECTIVE AND OBJECTIVE BOX
HPI:  82 y/o female with PMHx of HTN, HLD, non obstructive CAD with normal EF on 3/30/21 @[facility] (CTS clearance) hx of severe MR s/p Mitral Valve Repair and PFO closure via right mini thoracotomy on 3/31/21 @[facility] with post op day two episode of Atrial Fibrillation in which patient was treated with Amiodarone and BB converting back to NSR (pt. was d/c Amiodarone in May 2021) presents to [facility] ER  12/20/21, complaining of  multiple episodes of palpitations with dizziness that woke her up at 10PM last night, each episode no more than a few minutes but recurred bout 5 times over the span of 4 hours last night.  Patient denies associated  CP, syncope, N/V, diaphoresis.  ROS + for intermittent dyspnea and  GRAMAJO with walking uphill and taking stairs.        In ER ECG reveals Atrial Fibrillation , however, on telemetry, pt's HR went up to 140's in which she was given both Metoprolol Tart 50mg PO X1 and Lovenox 75mg SQ X1 (2AM).  Labs Troponin neg X1, ,  H/H 13.3/39.6, Blood Glucose 117, Mg 1.9 (replenished Mag Oxide 400mg PO X1.      Since admission, patient's tele without any episodes of Afib, has been in SR with episodes of AT and PAC's    of note, patient has Zio for 11 days from 7/22-8/03/21 revealing no  episodes of A fib, no heart blocks, no pauses  patient in SR  with sinus and atrial, ventricular ectopy         1 run of 13 beats VT, 23 sec ventricular trigeminy, 5 beat SVT      PAST MEDICAL & SURGICAL HISTORY:  HTN (hypertension)    HLD (hyperlipidemia)    Mitral regurgitation    Mitral prolapse    Atrial fibrillation with RVR    S/P MVR (mitral valve repair)  3/31/21    PFO (patent foramen ovale)  3/31/21    FAMILY HISTORY: NC      Social History: Single, lives with son, Azam Barba, [phone], denies Tobacco and hx of illicit drug use.  Occasional wine use.    pertinent home medications:         omeprazole 20 mg oral delayed release capsule: Last Dose Taken:  , 1 cap(s) orally once a day  · 	furosemide 20 mg oral tablet: Last Dose Taken:  , 1 tab(s) orally once a day  · 	Aspirin Enteric Coated 81 mg oral delayed release tablet: Last Dose Taken:  , 1 tab(s) orally once a day  · 	metoprolol succinate 25 mg oral tablet, extended release: Last Dose Taken:  , 1 tab(s) orally 2 times a day   · 	losartan 50 mg oral tablet: Last Dose Taken:  , 1 tab(s) orally once a day  · 	Multiple Vitamins oral capsule: Last Dose Taken:  , 1 cap(s) orally once a day        Inpatient Medications:   aspirin enteric coated 81 milliGRAM(s) Oral daily  furosemide    Tablet 20 milliGRAM(s) Oral daily  losartan 50 milliGRAM(s) Oral daily  metoprolol tartrate 25 milliGRAM(s) Oral two times a day  pantoprazole    Tablet 40 milliGRAM(s) Oral before breakfast      Allergies NKDA  opioid-like analgesics  intolerance (Stomach Upset; Vomiting; Nausea)  Valium Anxiety / Panic attack    ROS:   CONSTITUTIONAL: Denies fever, chills, weight loss, fatigue  HEENT: Pt denies  RESPIRATORY: see HPI, denies orthopnea  CARDIOVASCULAR: see HPI  GASTROINTESTINAL: Pt denies  NEUROLOGICAL: Pt denies  MSK: denies  SKIN: Pt denies   PSYCHIATRIC: Pt denies  HEME/LYMPH: Pt denies    PHYSICAL:  T(C): 36.6 (12-20-21 @ 13:13), Max: 37 (12-20-21 @ 06:08)  HR: 85 (12-20-21 @ 14:00) (78 - 122)  BP: 139/86 (12-20-21 @ 14:00) (139/86 - 161/86)  RR: 18 (12-20-21 @ 14:00) (18 - 18)  SpO2: 98% (12-20-21 @ 14:00) (96% - 98%)    Exam:  Constitutional: NAD  HEENT: Normocephalic atraumatic  Cardiovascular: Normal S1 S2, No JVD  Respiratory: Lungs clear to auscultation	bilaterally. No wheezing, rhonchi, rales  Gastrointestinal:  Soft, NT/ND, + BS	  Neurologic: A&O x3, No deficit noted  Psychiatric: appropriate  mood and affect   Ext:  no edema, pulses pedal  2+ b/l       LABS:  CBC:                       13.0   7.70  )-----------( 206      ( 20 Dec 2021 06:37 )             39.3       Chemistry: 12-20    146<H>  |  109<H>  |  17  ----------------------------<  98  3.5   |  23  |  0.98    Ca    8.7      20 Dec 2021 06:37  Mg     1.8     12-20    TPro  6.0  /  Alb  3.6  /  TBili  0.4  /  DBili  0.2  /  AST  19  /  ALT  12  /  AlkPhos  61  12-20      Coags: PT/INR - ( 20 Dec 2021 06:37 )   PT: 11.9 sec;   INR: 0.99          PTT - ( 20 Dec 2021 06:37 )  PTT:35.1 sec    Troponin: CARDIAC MARKERS ( 20 Dec 2021 06:37 )  x     / 0.01 ng/mL / 51 U/L / x     / 2.6 ng/mL  CARDIAC MARKERS ( 20 Dec 2021 01:36 )  x     / 0.01 ng/mL / x     / x     / x            LFTs: LIVER FUNCTIONS - ( 20 Dec 2021 06:37 )  Alb: 3.6 g/dL / Pro: 6.0 g/dL / ALK PHOS: 61 U/L / ALT: 12 U/L / AST: 19 U/L / GGT: x               ECHO:     Prior EP procedures: none    Physician Assistant Assessment/ Plan:         HPI:  80 y/o female with PMHx of HTN, HLD, non obstructive CAD with normal EF on 3/30/21 @[facility] (CTS clearance) hx of severe MR s/p Mitral Valve Repair and PFO closure via right mini thoracotomy on 3/31/21 @[facility] with post op day two episode of Atrial Fibrillation in which patient was treated with Amiodarone and BB converting back to NSR (pt. was d/c Amiodarone in May 2021) presents to [facility] ER  12/20/21, complaining of  multiple episodes of palpitations with dizziness that woke her up at 10PM last night, each episode no more than a few minutes but recurred bout 5 times over the span of 4 hours last night.  Patient denies associated  CP, syncope, N/V, diaphoresis.  ROS + for intermittent dyspnea and  GRAMAJO with walking uphill and taking stairs.        In ER ECG reveals Atrial Fibrillation , however, on telemetry, pt's HR went up to 140's in which she was given both Metoprolol Tart 50mg PO X1 and Lovenox 75mg SQ X1 (2AM).  Labs Troponin neg X1, ,  H/H 13.3/39.6, Blood Glucose 117, Mg 1.9 (replenished Mag Oxide 400mg PO X1.      Since admission, patient's tele without any episodes of Afib, has been in SR with episodes of AT and PAC's    of note, patient has Zio for 11 days from 7/22-8/03/21 revealing no  episodes of A fib, no heart blocks, no pauses  patient in SR  with sinus and atrial, ventricular ectopy         1 run of 13 beats VT, 23 sec ventricular trigeminy, 5 beat SVT    ECHO TTE 12/20/21:  1. Mild symmetric left ventricular hypertrophy.   2. Normal left and right ventricular size and systolic function.   3. Aortic sclerosis without significant stenosis.   4. An annuloplasty ring is noted in the mitral position. The mean   transvalvular gradient is 3.30 mmHg at a heart rate of 73 bpm.   5. At least mild-to-moderate eccentric mitral regurgitation visualized   with echo contrast.   6. Pulmonary hypertension present, pulmonary artery systolic pressure is   35 mmHg.   7. No pericardial effusion.          PAST MEDICAL & SURGICAL HISTORY:  HTN (hypertension)    HLD (hyperlipidemia)    Mitral regurgitation    Mitral prolapse    Atrial fibrillation with RVR    S/P MVR (mitral valve repair)  3/31/21    PFO (patent foramen ovale)  3/31/21    FAMILY HISTORY: NC      Social History: Single, lives with son, Azam Barba, [phone], denies Tobacco and hx of illicit drug use.  Occasional wine use.    pertinent home medications:         omeprazole 20 mg oral delayed release capsule: Last Dose Taken:  , 1 cap(s) orally once a day  · 	furosemide 20 mg oral tablet: Last Dose Taken:  , 1 tab(s) orally once a day  · 	Aspirin Enteric Coated 81 mg oral delayed release tablet: Last Dose Taken:  , 1 tab(s) orally once a day  · 	metoprolol succinate 25 mg oral tablet, extended release: Last Dose Taken:  , 1 tab(s) orally 2 times a day   · 	losartan 50 mg oral tablet: Last Dose Taken:  , 1 tab(s) orally once a day  · 	Multiple Vitamins oral capsule: Last Dose Taken:  , 1 cap(s) orally once a day        Inpatient Medications:   aspirin enteric coated 81 milliGRAM(s) Oral daily  furosemide    Tablet 20 milliGRAM(s) Oral daily  losartan 50 milliGRAM(s) Oral daily  metoprolol tartrate 25 milliGRAM(s) Oral two times a day  pantoprazole    Tablet 40 milliGRAM(s) Oral before breakfast      Allergies NKDA  opioid-like analgesics  intolerance (Stomach Upset; Vomiting; Nausea)  Valium Anxiety / Panic attack    ROS:   CONSTITUTIONAL: Denies fever, chills, weight loss, fatigue  HEENT: Pt denies  RESPIRATORY: see HPI, denies orthopnea  CARDIOVASCULAR: see HPI  GASTROINTESTINAL: Pt denies  NEUROLOGICAL: Pt denies  MSK: denies  SKIN: Pt denies   PSYCHIATRIC: Pt denies  HEME/LYMPH: Pt denies    PHYSICAL:  T(C): 36.6 (12-20-21 @ 13:13), Max: 37 (12-20-21 @ 06:08)  HR: 85 (12-20-21 @ 14:00) (78 - 122)  BP: 139/86 (12-20-21 @ 14:00) (139/86 - 161/86)  RR: 18 (12-20-21 @ 14:00) (18 - 18)  SpO2: 98% (12-20-21 @ 14:00) (96% - 98%)    Exam:  Constitutional: NAD  HEENT: Normocephalic atraumatic  Cardiovascular: Normal S1 S2, No JVD  Respiratory: Lungs clear to auscultation	bilaterally. No wheezing, rhonchi, rales  Gastrointestinal:  Soft, NT/ND, + BS	  Neurologic: A&O x3, No deficit noted  Psychiatric: appropriate  mood and affect   Ext:  no edema, pulses pedal  2+ b/l       LABS:  CBC:                       13.0   7.70  )-----------( 206      ( 20 Dec 2021 06:37 )             39.3       Chemistry: 12-20    146<H>  |  109<H>  |  17  ----------------------------<  98  3.5   |  23  |  0.98    Ca    8.7      20 Dec 2021 06:37  Mg     1.8     12-20    TPro  6.0  /  Alb  3.6  /  TBili  0.4  /  DBili  0.2  /  AST  19  /  ALT  12  /  AlkPhos  61  12-20      Coags: PT/INR - ( 20 Dec 2021 06:37 )   PT: 11.9 sec;   INR: 0.99          PTT - ( 20 Dec 2021 06:37 )  PTT:35.1 sec    Troponin: CARDIAC MARKERS ( 20 Dec 2021 06:37 )  x     / 0.01 ng/mL / 51 U/L / x     / 2.6 ng/mL  CARDIAC MARKERS ( 20 Dec 2021 01:36 )  x     / 0.01 ng/mL / x     / x     / x            LFTs: LIVER FUNCTIONS - ( 20 Dec 2021 06:37 )  Alb: 3.6 g/dL / Pro: 6.0 g/dL / ALK PHOS: 61 U/L / ALT: 12 U/L / AST: 19 U/L / GGT: x               ECHO:     Prior EP procedures: none    Physician Assistant Assessment/ Plan:         HPI:  80 y/o female with PMHx of HTN, HLD, non obstructive CAD with normal EF on 3/30/21 @[facility] (CTS clearance) hx of severe MR s/p Mitral Valve Repair and PFO closure via right mini thoracotomy on 3/31/21 @[facility] with post op day two episode of Atrial Fibrillation in which patient was treated with Amiodarone and BB converting back to NSR (pt. was d/c Amiodarone in May 2021) presents to [facility] ER  12/20/21, complaining of  multiple episodes of palpitations with dizziness that woke her up at 10PM last night, each episode no more than a few minutes but recurred bout 5 times over the span of 4 hours last night.  Patient denies associated  CP, syncope, N/V, diaphoresis.  ROS + for intermittent dyspnea and  GRAMAJO with walking uphill and taking stairs.        In ER ECG reveals Atrial Fibrillation , however, on telemetry, pt's HR went up to 140's in which she was given both Metoprolol Tart 50mg PO X1 and Lovenox 75mg SQ X1 (2AM).  Labs Troponin neg X1, ,  H/H 13.3/39.6, Blood Glucose 117, Mg 1.9 (replenished Mag Oxide 400mg PO X1.      Since admission, patient's tele without any episodes of Afib, has been in SR with episodes of AT and PAC's    of note, patient had  Zio for 11 days from 7/22-8/03/21 revealing no  episodes of A fib, no heart blocks, no pauses  patient in SR  with sinus and atrial, ventricular ectopy         1 run of 13 beats VT, 23 sec ventricular trigeminy, 5 beat SVT    ECHO TTE 12/20/21:  1. Mild symmetric left ventricular hypertrophy.   2. Normal left and right ventricular size and systolic function.   3. Aortic sclerosis without significant stenosis.   4. An annuloplasty ring is noted in the mitral position. The mean   transvalvular gradient is 3.30 mmHg at a heart rate of 73 bpm.   5. At least mild-to-moderate eccentric mitral regurgitation visualized   with echo contrast.   6. Pulmonary hypertension present, pulmonary artery systolic pressure is   35 mmHg.   7. No pericardial effusion.          PAST MEDICAL & SURGICAL HISTORY:  HTN (hypertension)    HLD (hyperlipidemia)    Mitral regurgitation    Mitral prolapse    Atrial fibrillation with RVR    S/P MVR (mitral valve repair)  3/31/21    PFO (patent foramen ovale)  3/31/21    FAMILY HISTORY: NC      Social History: Single, lives with son, Azam Barba, [phone], denies Tobacco and hx of illicit drug use.  Occasional wine use.    pertinent home medications:         omeprazole 20 mg oral delayed release capsule: Last Dose Taken:  , 1 cap(s) orally once a day  · 	furosemide 20 mg oral tablet: Last Dose Taken:  , 1 tab(s) orally once a day  · 	Aspirin Enteric Coated 81 mg oral delayed release tablet: Last Dose Taken:  , 1 tab(s) orally once a day  · 	metoprolol succinate 25 mg oral tablet, extended release: Last Dose Taken:  , 1 tab(s) orally 2 times a day   · 	losartan 50 mg oral tablet: Last Dose Taken:  , 1 tab(s) orally once a day  · 	Multiple Vitamins oral capsule: Last Dose Taken:  , 1 cap(s) orally once a day        Inpatient Medications:   aspirin enteric coated 81 milliGRAM(s) Oral daily  furosemide    Tablet 20 milliGRAM(s) Oral daily  losartan 50 milliGRAM(s) Oral daily  metoprolol tartrate 25 milliGRAM(s) Oral two times a day  pantoprazole    Tablet 40 milliGRAM(s) Oral before breakfast      Allergies NKDA  opioid-like analgesics  intolerance (Stomach Upset; Vomiting; Nausea)  Valium Anxiety / Panic attack    ROS:   CONSTITUTIONAL: Denies fever, chills, weight loss, fatigue  HEENT: Pt denies  RESPIRATORY: see HPI, denies orthopnea  CARDIOVASCULAR: see HPI  GASTROINTESTINAL: Pt denies  NEUROLOGICAL: Pt denies  MSK: denies  SKIN: Pt denies   PSYCHIATRIC: Pt denies  HEME/LYMPH: Pt denies    PHYSICAL:  T(C): 36.6 (12-20-21 @ 13:13), Max: 37 (12-20-21 @ 06:08)  HR: 85 (12-20-21 @ 14:00) (78 - 122)  BP: 139/86 (12-20-21 @ 14:00) (139/86 - 161/86)  RR: 18 (12-20-21 @ 14:00) (18 - 18)  SpO2: 98% (12-20-21 @ 14:00) (96% - 98%)    Exam:  Constitutional: NAD  HEENT: Normocephalic atraumatic  Cardiovascular: Normal S1 S2, No JVD  Respiratory: Lungs clear to auscultation	bilaterally. No wheezing, rhonchi, rales  Gastrointestinal:  Soft, NT/ND, + BS	  Neurologic: A&O x3, No deficit noted  Psychiatric: appropriate  mood and affect   Ext:  no edema, pulses pedal  2+ b/l       LABS:  CBC:                       13.0   7.70  )-----------( 206      ( 20 Dec 2021 06:37 )             39.3       Chemistry: 12-20    146<H>  |  109<H>  |  17  ----------------------------<  98  3.5   |  23  |  0.98    Ca    8.7      20 Dec 2021 06:37  Mg     1.8     12-20    TPro  6.0  /  Alb  3.6  /  TBili  0.4  /  DBili  0.2  /  AST  19  /  ALT  12  /  AlkPhos  61  12-20      Coags: PT/INR - ( 20 Dec 2021 06:37 )   PT: 11.9 sec;   INR: 0.99          PTT - ( 20 Dec 2021 06:37 )  PTT:35.1 sec    Troponin: CARDIAC MARKERS ( 20 Dec 2021 06:37 )  x     / 0.01 ng/mL / 51 U/L / x     / 2.6 ng/mL  CARDIAC MARKERS ( 20 Dec 2021 01:36 )  x     / 0.01 ng/mL / x     / x     / x            LFTs: LIVER FUNCTIONS - ( 20 Dec 2021 06:37 )  Alb: 3.6 g/dL / Pro: 6.0 g/dL / ALK PHOS: 61 U/L / ALT: 12 U/L / AST: 19 U/L / GGT: x             Prior EP procedures: none    Physician Assistant Assessment/ Plan:         HPI:  80 y/o female with PMHx of HTN, HLD, non obstructive CAD with normal EF on 3/30/21 @[facility] (CTS clearance) hx of severe MR s/p Mitral Valve Repair and PFO closure via right mini thoracotomy on 3/31/21 @[facility] with post op day two episode of Atrial Fibrillation in which patient was treated with Amiodarone and BB converting back to NSR (pt. was d/c Amiodarone in May 2021) presents to [facility] ER  12/20/21, complaining of  multiple episodes of palpitations with dizziness that woke her up at 10PM last night, each episode no more than a few minutes but recurred bout 5 times over the span of 4 hours last night.  Patient denies associated  CP, syncope, N/V, diaphoresis.  ROS + for intermittent dyspnea and  GRAMAJO with walking uphill and taking stairs.  Of note, patient home metoprolol ER 25mg was recently increased to BID secondary to patient being tachy at outpatient cardiac rehab.      In ER ECG reveals Atrial Fibrillation , however, on telemetry, pt's HR went up to 140's in which she was given both Metoprolol Tart 50mg PO X1 and Lovenox 75mg SQ X1 (2AM).  Labs Troponin neg X1, ,  H/H 13.3/39.6, Blood Glucose 117, Mg 1.9 (replenished Mag Oxide 400mg PO X1.      Since admission, patient's tele without any episodes of Afib, has been in SR with episodes of AT and PAC's    of note, patient had  Zio for 11 days from 7/22-8/03/21 revealing no  episodes of A fib, no heart blocks, no pauses  patient in SR  with sinus and atrial, ventricular ectopy         1 run of 13 beats VT, 23 sec ventricular trigeminy, 5 beat SVT    ECHO TTE 12/20/21:  1. Mild symmetric left ventricular hypertrophy.   2. Normal left and right ventricular size and systolic function.   3. Aortic sclerosis without significant stenosis.   4. An annuloplasty ring is noted in the mitral position. The mean   transvalvular gradient is 3.30 mmHg at a heart rate of 73 bpm.   5. At least mild-to-moderate eccentric mitral regurgitation visualized   with echo contrast.   6. Pulmonary hypertension present, pulmonary artery systolic pressure is   35 mmHg.   7. No pericardial effusion.          PAST MEDICAL & SURGICAL HISTORY:  HTN (hypertension)    HLD (hyperlipidemia)    Mitral regurgitation    Mitral prolapse    Atrial fibrillation with RVR    S/P MVR (mitral valve repair)  3/31/21    PFO (patent foramen ovale)  3/31/21    FAMILY HISTORY: NC      Social History: Single, lives with son, Azam Barba, [phone], denies Tobacco and hx of illicit drug use.  Occasional wine use.    pertinent home medications:         omeprazole 20 mg oral delayed release capsule: Last Dose Taken:  , 1 cap(s) orally once a day  · 	furosemide 20 mg oral tablet: Last Dose Taken:  , 1 tab(s) orally once a day  · 	Aspirin Enteric Coated 81 mg oral delayed release tablet: Last Dose Taken:  , 1 tab(s) orally once a day  · 	metoprolol succinate 25 mg oral tablet, extended release: Last Dose Taken:  , 1 tab(s) orally 2 times a day   · 	losartan 50 mg oral tablet: Last Dose Taken:  , 1 tab(s) orally once a day  · 	Multiple Vitamins oral capsule: Last Dose Taken:  , 1 cap(s) orally once a day        Inpatient Medications:   aspirin enteric coated 81 milliGRAM(s) Oral daily  furosemide    Tablet 20 milliGRAM(s) Oral daily  losartan 50 milliGRAM(s) Oral daily  metoprolol tartrate 25 milliGRAM(s) Oral two times a day (increased to BID  1 week ago for being tachycardic in rehab)  pantoprazole    Tablet 40 milliGRAM(s) Oral before breakfast      Allergies NKDA  opioid-like analgesics  intolerance (Stomach Upset; Vomiting; Nausea)  Valium Anxiety / Panic attack    ROS:   CONSTITUTIONAL: Denies fever, chills, weight loss, fatigue  HEENT: Pt denies  RESPIRATORY: see HPI, denies orthopnea  CARDIOVASCULAR: see HPI  GASTROINTESTINAL: Pt denies  NEUROLOGICAL: Pt denies  MSK: denies  SKIN: Pt denies   PSYCHIATRIC: Pt denies  HEME/LYMPH: Pt denies    PHYSICAL:  T(C): 36.6 (12-20-21 @ 13:13), Max: 37 (12-20-21 @ 06:08)  HR: 85 (12-20-21 @ 14:00) (78 - 122)  BP: 139/86 (12-20-21 @ 14:00) (139/86 - 161/86)  RR: 18 (12-20-21 @ 14:00) (18 - 18)  SpO2: 98% (12-20-21 @ 14:00) (96% - 98%)    Exam:  Constitutional: NAD  HEENT: Normocephalic atraumatic  Cardiovascular: Normal S1 S2, No JVD  Respiratory: Lungs clear to auscultation	bilaterally. No wheezing, rhonchi, rales  Gastrointestinal:  Soft, NT/ND, + BS	  Neurologic: A&O x3, No deficit noted  Psychiatric: appropriate  mood and affect   Ext:  no edema, pulses pedal  2+ b/l       LABS:  CBC:                       13.0   7.70  )-----------( 206      ( 20 Dec 2021 06:37 )             39.3       Chemistry: 12-20    146<H>  |  109<H>  |  17  ----------------------------<  98  3.5   |  23  |  0.98    Ca    8.7      20 Dec 2021 06:37  Mg     1.8     12-20    TPro  6.0  /  Alb  3.6  /  TBili  0.4  /  DBili  0.2  /  AST  19  /  ALT  12  /  AlkPhos  61  12-20      Coags: PT/INR - ( 20 Dec 2021 06:37 )   PT: 11.9 sec;   INR: 0.99          PTT - ( 20 Dec 2021 06:37 )  PTT:35.1 sec    Troponin: CARDIAC MARKERS ( 20 Dec 2021 06:37 )  x     / 0.01 ng/mL / 51 U/L / x     / 2.6 ng/mL  CARDIAC MARKERS ( 20 Dec 2021 01:36 )  x     / 0.01 ng/mL / x     / x     / x            LFTs: LIVER FUNCTIONS - ( 20 Dec 2021 06:37 )  Alb: 3.6 g/dL / Pro: 6.0 g/dL / ALK PHOS: 61 U/L / ALT: 12 U/L / AST: 19 U/L / GGT: x             Prior EP procedures: none    Physician Assistant Assessment/ Plan:         HPI:  80 y/o female with PMHx of HTN, HLD, non obstructive CAD with normal EF on 3/30/21 @[facility] (CTS clearance) hx of severe MR s/p Mitral Valve Repair and PFO closure via right mini thoracotomy on 3/31/21 @[facility] with post op day two episode of Atrial Fibrillation in which patient was treated with Amiodarone and BB converting back to NSR (pt. was d/c Amiodarone in May 2021), who presented to [facility] ER  12/20/21, complaining of  multiple episodes of palpitations with dizziness that woke her up at 10PM last night, each episode no more than a few minutes but recurred bout 5 times over the span of 4 hours last night.  Patient denies associated  CP, syncope, N/V, diaphoresis.  ROS + for intermittent dyspnea and  GRAMAJO with walking uphill and taking stairs.  Of note, patient home metoprolol ER 25mg was recently increased to BID secondary to patient being tachy at outpatient cardiac rehab.      In ER ECG reveals AT at  103, however on telemetry in ED  pt's HR went up to 140's and patient was thought to be in A fib RVR ( no supporting tele/EKG available from ER with Afib) -patient was given  Metoprolol Tart 50mg PO X1 and Lovenox 75mg SQ X1 (2AM).  Labs Troponin neg X1, ,  H/H 13.3/39.6, Blood Glucose 117, Mg 1.9 (replenished Mag Oxide 400mg PO X1.      Since admission, patient's tele without any episodes of Afib, has been in SR with episodes of AT and PAC's    of note, patient had  Zio for 11 days from 7/22-8/03/21 revealing no  episodes of A fib, no heart blocks, no pauses  patient in SR  with sinus and atrial, ventricular ectopy         1 run of 13 beats VT, 23 sec ventricular trigeminy, 5 beat SVT    ECHO TTE 12/20/21:  1. Mild symmetric left ventricular hypertrophy.   2. Normal left and right ventricular size and systolic function.   3. Aortic sclerosis without significant stenosis.   4. An annuloplasty ring is noted in the mitral position. The mean   transvalvular gradient is 3.30 mmHg at a heart rate of 73 bpm.   5. At least mild-to-moderate eccentric mitral regurgitation visualized   with echo contrast.   6. Pulmonary hypertension present, pulmonary artery systolic pressure is   35 mmHg.   7. No pericardial effusion.          PAST MEDICAL & SURGICAL HISTORY:  HTN (hypertension)    HLD (hyperlipidemia)    Mitral regurgitation    Mitral prolapse    Atrial fibrillation with RVR    S/P MVR (mitral valve repair)  3/31/21    PFO (patent foramen ovale)  3/31/21    FAMILY HISTORY: NC      Social History: Single, lives with son, Azam Barba, [phone], denies Tobacco and hx of illicit drug use.  Occasional wine use.    pertinent home medications:         omeprazole 20 mg oral delayed release capsule: Last Dose Taken:  , 1 cap(s) orally once a day  · 	furosemide 20 mg oral tablet: Last Dose Taken:  , 1 tab(s) orally once a day  · 	Aspirin Enteric Coated 81 mg oral delayed release tablet: Last Dose Taken:  , 1 tab(s) orally once a day  · 	metoprolol succinate 25 mg oral tablet, extended release: Last Dose Taken:  , 1 tab(s) orally 2 times a day   · 	losartan 50 mg oral tablet: Last Dose Taken:  , 1 tab(s) orally once a day  · 	Multiple Vitamins oral capsule: Last Dose Taken:  , 1 cap(s) orally once a day        Inpatient Medications:   aspirin enteric coated 81 milliGRAM(s) Oral daily  furosemide    Tablet 20 milliGRAM(s) Oral daily  losartan 50 milliGRAM(s) Oral daily  metoprolol tartrate 25 milliGRAM(s) Oral two times a day (increased to BID  1 week ago for being tachycardic in rehab)  pantoprazole    Tablet 40 milliGRAM(s) Oral before breakfast      Allergies NKDA  opioid-like analgesics  intolerance (Stomach Upset; Vomiting; Nausea)  Valium Anxiety / Panic attack    ROS:   CONSTITUTIONAL: Denies fever, chills, weight loss, fatigue  HEENT: Pt denies  RESPIRATORY: see HPI, denies orthopnea  CARDIOVASCULAR: see HPI  GASTROINTESTINAL: Pt denies  NEUROLOGICAL: Pt denies  MSK: denies  SKIN: Pt denies   PSYCHIATRIC: Pt denies  HEME/LYMPH: Pt denies    PHYSICAL:  T(C): 36.6 (12-20-21 @ 13:13), Max: 37 (12-20-21 @ 06:08)  HR: 85 (12-20-21 @ 14:00) (78 - 122)  BP: 139/86 (12-20-21 @ 14:00) (139/86 - 161/86)  RR: 18 (12-20-21 @ 14:00) (18 - 18)  SpO2: 98% (12-20-21 @ 14:00) (96% - 98%)    Exam:  Constitutional: NAD  HEENT: Normocephalic atraumatic  Cardiovascular: Normal S1 S2, No JVD  Respiratory: Lungs clear to auscultation	bilaterally. No wheezing, rhonchi, rales  Gastrointestinal:  Soft, NT/ND, + BS	  Neurologic: A&O x3, No deficit noted  Psychiatric: appropriate  mood and affect   Ext:  no edema, pulses pedal  2+ b/l       LABS:  CBC:                       13.0   7.70  )-----------( 206      ( 20 Dec 2021 06:37 )             39.3       Chemistry: 12-20    146<H>  |  109<H>  |  17  ----------------------------<  98  3.5   |  23  |  0.98    Ca    8.7      20 Dec 2021 06:37  Mg     1.8     12-20    TPro  6.0  /  Alb  3.6  /  TBili  0.4  /  DBili  0.2  /  AST  19  /  ALT  12  /  AlkPhos  61  12-20      Coags: PT/INR - ( 20 Dec 2021 06:37 )   PT: 11.9 sec;   INR: 0.99          PTT - ( 20 Dec 2021 06:37 )  PTT:35.1 sec    Troponin: CARDIAC MARKERS ( 20 Dec 2021 06:37 )  x     / 0.01 ng/mL / 51 U/L / x     / 2.6 ng/mL  CARDIAC MARKERS ( 20 Dec 2021 01:36 )  x     / 0.01 ng/mL / x     / x     / x            LFTs: LIVER FUNCTIONS - ( 20 Dec 2021 06:37 )  Alb: 3.6 g/dL / Pro: 6.0 g/dL / ALK PHOS: 61 U/L / ALT: 12 U/L / AST: 19 U/L / GGT: x             Prior EP procedures: none    Physician Assistant Assessment/ Plan:  80 y/o female with PMHx of HTN, HLD, non obstructive CAD with normal EF on 3/30/21 @[facility] (CTS clearance) hx of severe MR s/p Mitral Valve Repair and PFO closure via right mini thoracotomy on 3/31/21 @[facility] with post op day two episode of Atrial Fibrillation in which patient was treated with Amiodarone and BB converting back to NSR (pt. was d/c Amiodarone in May 2021) presents to [facility] ER  12/20/21, complaining of  multiple episodes of palpitations with dizziness that woke her up at 10PM last night, each episode no more than a few minutes but recurred bout 5 times over the span of 4 hours last night.  In ER ECG reveals AT at  103, however, on telemetry in ED  went up to 140's and patient was thought to be in A fib RVR ( no supporting tele/EKG from AER with Afib). Since admission, patient's tele without any episodes of Afib, has been in SR with episodes of AT and PAC's      episodes of palpitations and dizziness - patient consented for ILR implant  for tomorrow  tele with runs of AT, average heart rate in 80's  - continue current regimen metoprolol ER 25mg BID with extra dose to be taken during episodes of palpitations  F/up  with Dr Gee as outpatient 4-5 weeks [phone]     HPI:  82 y/o female with PMHx of HTN, HLD, non obstructive CAD with normal EF on 3/30/21 @[facility] (CTS clearance) hx of severe MR s/p Mitral Valve Repair and PFO closure via right mini thoracotomy on 3/31/21 @[facility] with post op day two episode of Atrial Fibrillation in which patient was treated with Amiodarone and BB converting back to NSR (pt. was d/c Amiodarone in May 2021), who presented to [facility] ER  12/20/21, complaining of  multiple episodes of palpitations with dizziness that woke her up at 10PM last night, each episode no more than a few minutes but recurred bout 5 times over the span of 4 hours last night.  Patient denies associated  CP, syncope, N/V, diaphoresis.  ROS + for intermittent dyspnea and  GRAMAJO with walking uphill and taking stairs.  Of note, patient home metoprolol ER 25mg was recently increased to BID secondary to patient being tachy at outpatient cardiac rehab.      In ER ECG reveals AT at  103, however on telemetry in ED  pt's HR went up to 140's and patient was thought to be in A fib RVR ( no supporting tele/EKG available from ER with Afib) -patient was given  Metoprolol Tart 50mg PO X1 and Lovenox 75mg SQ X1 (2AM).  Labs Troponin neg X1, ,  H/H 13.3/39.6, Blood Glucose 117, Mg 1.9 (replenished Mag Oxide 400mg PO X1.      Since admission, patient's tele without any episodes of Afib, has been in SR with episodes of AT and PAC's    of note, patient had  Zio for 11 days from 7/22-8/03/21 revealing no  episodes of A fib, no heart blocks, no pauses  patient in SR  with sinus and atrial, ventricular ectopy         1 run of 13 beats VT, 23 sec ventricular trigeminy, 5 beat SVT    ECHO TTE 12/20/21:  1. Mild symmetric left ventricular hypertrophy.   2. Normal left and right ventricular size and systolic function.   3. Aortic sclerosis without significant stenosis.   4. An annuloplasty ring is noted in the mitral position. The mean   transvalvular gradient is 3.30 mmHg at a heart rate of 73 bpm.   5. At least mild-to-moderate eccentric mitral regurgitation visualized   with echo contrast.   6. Pulmonary hypertension present, pulmonary artery systolic pressure is   35 mmHg.   7. No pericardial effusion.          PAST MEDICAL & SURGICAL HISTORY:  HTN (hypertension)    HLD (hyperlipidemia)    Mitral regurgitation    Mitral prolapse    Atrial fibrillation with RVR    S/P MVR (mitral valve repair)  3/31/21    PFO (patent foramen ovale)  3/31/21    FAMILY HISTORY: NC      Social History: Single, lives with son, Azam Barba, [phone], denies Tobacco and hx of illicit drug use.  Occasional wine use.    pertinent home medications:         omeprazole 20 mg oral delayed release capsule: Last Dose Taken:  , 1 cap(s) orally once a day  · 	furosemide 20 mg oral tablet: Last Dose Taken:  , 1 tab(s) orally once a day  · 	Aspirin Enteric Coated 81 mg oral delayed release tablet: Last Dose Taken:  , 1 tab(s) orally once a day  · 	metoprolol succinate 25 mg oral tablet, extended release: Last Dose Taken:  , 1 tab(s) orally 2 times a day   · 	losartan 50 mg oral tablet: Last Dose Taken:  , 1 tab(s) orally once a day  · 	Multiple Vitamins oral capsule: Last Dose Taken:  , 1 cap(s) orally once a day        Inpatient Medications:   aspirin enteric coated 81 milliGRAM(s) Oral daily  furosemide    Tablet 20 milliGRAM(s) Oral daily  losartan 50 milliGRAM(s) Oral daily  metoprolol tartrate 25 milliGRAM(s) Oral two times a day (increased to BID  1 week ago for being tachycardic in rehab)  pantoprazole    Tablet 40 milliGRAM(s) Oral before breakfast      Allergies NKDA  opioid-like analgesics  intolerance (Stomach Upset; Vomiting; Nausea)  Valium Anxiety / Panic attack    ROS:   CONSTITUTIONAL: Denies fever, chills, weight loss, fatigue  HEENT: Pt denies  RESPIRATORY: see HPI, denies orthopnea  CARDIOVASCULAR: see HPI  GASTROINTESTINAL: Pt denies  NEUROLOGICAL: Pt denies  MSK: denies  SKIN: Pt denies   PSYCHIATRIC: Pt denies  HEME/LYMPH: Pt denies    PHYSICAL:  T(C): 36.6 (12-20-21 @ 13:13), Max: 37 (12-20-21 @ 06:08)  HR: 85 (12-20-21 @ 14:00) (78 - 122)  BP: 139/86 (12-20-21 @ 14:00) (139/86 - 161/86)  RR: 18 (12-20-21 @ 14:00) (18 - 18)  SpO2: 98% (12-20-21 @ 14:00) (96% - 98%)    Exam:  Constitutional: NAD  HEENT: Normocephalic atraumatic  Cardiovascular: Normal S1 S2, No JVD  Respiratory: Lungs clear to auscultation	bilaterally. No wheezing, rhonchi, rales  Gastrointestinal:  Soft, NT/ND, + BS	  Neurologic: A&O x3, No deficit noted  Psychiatric: appropriate  mood and affect   Ext:  no edema, pulses pedal  2+ b/l       LABS:  CBC:                       13.0   7.70  )-----------( 206      ( 20 Dec 2021 06:37 )             39.3       Chemistry: 12-20    146<H>  |  109<H>  |  17  ----------------------------<  98  3.5   |  23  |  0.98    Ca    8.7      20 Dec 2021 06:37  Mg     1.8     12-20    TPro  6.0  /  Alb  3.6  /  TBili  0.4  /  DBili  0.2  /  AST  19  /  ALT  12  /  AlkPhos  61  12-20      Coags: PT/INR - ( 20 Dec 2021 06:37 )   PT: 11.9 sec;   INR: 0.99          PTT - ( 20 Dec 2021 06:37 )  PTT:35.1 sec    Troponin: CARDIAC MARKERS ( 20 Dec 2021 06:37 )  x     / 0.01 ng/mL / 51 U/L / x     / 2.6 ng/mL  CARDIAC MARKERS ( 20 Dec 2021 01:36 )  x     / 0.01 ng/mL / x     / x     / x            LFTs: LIVER FUNCTIONS - ( 20 Dec 2021 06:37 )  Alb: 3.6 g/dL / Pro: 6.0 g/dL / ALK PHOS: 61 U/L / ALT: 12 U/L / AST: 19 U/L / GGT: x             Prior EP procedures: none    Physician Assistant Assessment/ Plan:  82 y/o female with PMHx of HTN, HLD, non obstructive CAD with normal EF on 3/30/21 @[facility] (CTS clearance) hx of severe MR s/p Mitral Valve Repair and PFO closure via right mini thoracotomy on 3/31/21 @[facility] with post op day two episode of Atrial Fibrillation in which patient was treated with Amiodarone and BB converting back to NSR (pt. was d/c Amiodarone in May 2021) presents to [facility] ER  12/20/21, complaining of  multiple episodes of palpitations with dizziness that woke her up at 10PM last night, each episode no more than a few minutes but recurred bout 5 times over the span of 4 hours last night.  In ER ECG reveals AT at  103, however, on telemetry in ED  went up to 140's and patient was thought to be in A fib RVR ( no supporting tele/EKG from AER with Afib). Since admission, patient's tele without any episodes of Afib, has been in SR with episodes of AT and PAC's      episodes of palpitations and dizziness - patient consented for ILR implant  for tomorrow  tele with runs of AT, average heart rate in 80's  - continue current regimen metoprolol ER 25mg BID with extra dose to be taken during episodes of palpitations  No documented evidence of A fib (since 2 episodes post op Afib in March 2021) - no AC recommended at present  F/up  with Dr Gee as outpatient 4-5 weeks [phone]

## 2021-12-20 NOTE — PATIENT PROFILE ADULT - FUNCTIONAL ASSESSMENT - BASIC MOBILITY 1.
Friday at about 5am at work patient said his entire lower back started to hurt to the point he was having trouble moving. He also stated that his left leg went numb. Patient stated \"The pain has not gone away for the last four days but it wasn't as bad. Today the super severe pain came back about 12am but only in the left side of the back with the numbness in the left leg again\". He doesn't complain of worse pain when he moves but he said that the pain comes after he has been up and walking around for longer periods of time. He complains that his throat has been hurting since yesterday.   4 = No assist / stand by assistance

## 2021-12-20 NOTE — ED ADULT TRIAGE NOTE - CHIEF COMPLAINT QUOTE
woke up at 10 pm last night with palpitation with  dizziness . shortness of breath  on/off for few days,

## 2021-12-20 NOTE — PATIENT PROFILE ADULT - FALL HARM RISK - HARM RISK INTERVENTIONS

## 2021-12-20 NOTE — H&P ADULT - PROBLEM SELECTOR PLAN 6
3/31/21 @[facility] with Dr. Wilcox via mini thoracotomy  on ASA Ec 81mg PO daily.   Dx Cath 3/30/21 non obstructive coronaries.

## 2021-12-20 NOTE — H&P ADULT - NSICDXPASTMEDICALHX_GEN_ALL_CORE_FT
PAST MEDICAL HISTORY:  Atrial fibrillation with RVR     HLD (hyperlipidemia)     HTN (hypertension)     Mitral prolapse     Mitral regurgitation

## 2021-12-20 NOTE — H&P ADULT - ATTENDING COMMENTS
See PA note written above, for details. I reviewed the PA documentation.  I have personally seen and examined this patient today. I reviewed vitals, labs, medications, cardiac studies and additional imaging.  I agree with the PA's findings and plans as written above with the following additions/amendments:  Patient with no capture of atrial arrhythmia on telemetry or EKG, recordings c/w NSR.   EP c/s for consideration of ILR   Cont BB for rate control  Discussed tx A/C with patient who defers to decision of her outpt cardiologist Dr Barr. Discussion had with Dr Barr and plan for EP evaluation and ILR implantation on 12/21.  Will continue BB and will not reinstate amiodarone. Will decide on A/C in conjunction with EP.   TTE for cardiac structural assessment  PT evaluation for gait assessment  Jelly Plata M.D.  Cardiology Attending  55minutes spent on total encounter; more than 50% of the visit was spent counseling and/or coordinating care by the attending physician, with plan of care discussed with the patient, Dr Barr, EP team and cardiac team.

## 2021-12-20 NOTE — ED PROVIDER NOTE - OBJECTIVE STATEMENT
woke up at 10 pm last night with palpitation with  dizziness . shortness of breath  on/off for few days,  palpitations 80 yo F with past medical history of hypertension (Metoprolol, Losartan), GERD (omeprazole), PSH of benign breast cyst removal, ankle surgery and hammer toe surgery, Mitral valve prolapse, and severe Mitral regurgitation, s/p mitral valve repair and PFO closure on 3/31/21 (9 months ago) with Dr. Wilcox (underwent cardiac catheterization at that time which showed no significant coronary artery disease) with intraoperative transesophageal echocardiogram that showed normal left ventricular function presents today with worsening of her chronic shortness of breath and leg swelling over the past few days with 5 episodes of palpitations and dizziness described as "vertigo" in the past 4 hours. Denies chest pain. Pt sttaes Postoperative day two she developed atrial fibrillation and was treated with Amiodarone. She converted to normal sinus rhythm. Betablocker was titrated. She     woke up at 10 pm last night with palpitation with  dizziness . shortness of breath  on/off for few days,  palpitations 82 yo F with past medical history of hypertension (Metoprolol, Losartan), GERD (omeprazole), PSH of benign breast cyst removal, ankle surgery and hammer toe surgery, Mitral valve prolapse, and severe Mitral regurgitation, s/p mitral valve repair and PFO closure on 3/31/21 (9 months ago) with Dr. Wilcox (underwent cardiac catheterization at that time which showed no significant coronary artery disease) with intraoperative transesophageal echocardiogram that showed normal left ventricular function presents today with worsening of her chronic shortness of breath and leg swelling over the past few days with 5 episodes of palpitations and dizziness described as "vertigo" in the past 4 hours. Denies chest pain, focal weakness, numbness. She developed atrial fibrillation while in the hospital post valve repair and was treated with Amiodarone but did not require chronic anticoagulation after. Pt is fully vaccinated for Covid 19 with booster and had breakthrough infection a few months ago which did not require hospitalization. Denies fevers, chills, cough, abd pain, N,V,D, urinary sxs. No other complaints.

## 2021-12-20 NOTE — PROGRESS NOTE ADULT - ASSESSMENT
80 y/o female with PMHx of HTN, HLD, non-obstructive CAD with normal EF on 3/30/21 @[facility] (CTS clearance) hx of severe MR s/p Mitral Valve Repair and PFO closure via right mini thoracotomy w/ Dr Wilcox 3/31/21 @[facility] w/ post op day 2 episode of Atrial Fibrillation in which patient was treated with Amiodarone x 2mos and BB (no AC).  Pt presents to [facility] ER 12/20/21 c/o dyspnea on exertion going up stairs (unchanged since MVR) and intermittent palpitations/dizziness x few days. Reportedly had intermittent self resolving ?PAF while in ED (no recordings available). No Afib noted on tele review or EKG. Admitted to cardiac tele for further management. EP consulted for ILR placement. 82 y/o female with PMHx of COVID-19 (9/2021), HTN, HLD, non-obstructive CAD with normal EF on 3/30/21 @[facility] (CTS clearance) hx of severe MR s/p Mitral Valve Repair and PFO closure via right mini thoracotomy w/ Dr Wilcox 3/31/21 @[facility] w/ post op day 2 episode of Atrial Fibrillation in which patient was treated with Amiodarone x 2mos and BB (no AC).  Pt presents to [facility] ER 12/20/21 c/o dyspnea on exertion going up stairs (unchanged since MVR) and intermittent palpitations/dizziness x few days. Reportedly had intermittent self resolving ?PAF while in ED (no recordings available). No Afib noted on tele review or EKG. Admitted to cardiac tele for further management. EP consulted for ILR placement.

## 2021-12-20 NOTE — H&P ADULT - NSHPSOCIALHISTORY_GEN_ALL_CORE
Single, lives with son, Azam Barba, [phone], denies Tobacco and hx of illicit drug use.  Occasional wine use.

## 2021-12-20 NOTE — PROGRESS NOTE ADULT - PROBLEM SELECTOR PLAN 2
Normotensive, SBP 130s.  -c/w continue home Losartan 50mg PO daily and Metoprolol Tartrate 25mg PO BID  -c/w On Lasix 20mg PO daily

## 2021-12-20 NOTE — H&P ADULT - PROBLEM SELECTOR PLAN 8
Pt. was given dose of Lovenox 75mg SQ X1 in ER for Afib; will consider starting Eliquis 5mg PO bid.  Ambulate with assistance.

## 2021-12-20 NOTE — H&P ADULT - PROBLEM SELECTOR PLAN 7
3/31/21 @[facility] with Dr. Wilcox via mini thoracotomy on ASA Ec 81mg PO dailly.  Dx Cath 3/30/21 non obstructive coronaries.

## 2021-12-20 NOTE — PROGRESS NOTE ADULT - PROBLEM SELECTOR PLAN 1
Per report, Telemetry in ED w/ 's, ?Afib (no recordings available)- self resolved per ED note. Hx of brief post-op Afib during MVR in March. Was discharged home w/ Amiodarone x 2 mos and BB; no AC documented.  - s/p Metoprolol tartrate 50mg PO x1 and Lovenox 75mg SQ x1 in ED  - Admission shows EKG ST 100s w/ PACs. No Afib noted on tele  - TSH WNL  - c/w Metoprolol Tartrate 25mg PO BID (pt instructed to take extra dose if symptomatic w/ palpitations at home)  - NO indication to start anticoagulation given no evidence of documented Afib  - Allscript chart review noting pt had Ziopatch x 11 days in 7/2021 report revealed: no episodes of Afib. Few episodes of sinus and atrial, ventricular ectopy: 1 run of 13 beats VT, 23 sec ventricular trigeminy, 5 beat SVT  - EP consult for ILR placement, plan for 12/21  - ECHO revealed EF 55-60%, mild conc LVH, mild-moderate MR, Mitral annuloplasty ring, mean transvalvular gradient 3.3mm w/ HR 73bpm

## 2021-12-20 NOTE — H&P ADULT - NSICDXPASTSURGICALHX_GEN_ALL_CORE_FT
PAST SURGICAL HISTORY:  PFO (patent foramen ovale) 3/31/21    S/P MVR (mitral valve repair) 3/31/21

## 2021-12-20 NOTE — ED ADULT NURSE NOTE - PRO INTERPRETER NEED 2
Department of Internal Medicine  Progress Note      SUBJECTIVE:   No acute events overnight. Ibrahima Tolbert. No other vitals recorded. Plan for d/c to acute rehab now. To start xarelto today. No acute concerns.        ROS:  All 12 systems reviewed and negative except mentioned in HPI and Assessment and plan    MEDICATIONS:  Current Facility-Administered Medications   Medication Dose Route Frequency Provider Last Rate Last Dose    oxyCODONE-acetaminophen (PERCOCET) 7.5-325 MG per tablet 1 tablet  1 tablet Oral Q4H PRN Laura Scullin, DO        lidocaine 4 % external patch 3 patch  3 patch Transdermal Daily Laura Scullin, DO        [START ON 5/1/2019] tamsulosin (FLOMAX) capsule 0.4 mg  0.4 mg Oral Nightly Laura Scullin, DO        rivaroxaban (XARELTO) tablet 15 mg  15 mg Oral Daily with breakfast Merlin Cargo, MD        aspirin EC tablet 81 mg  81 mg Oral Daily Merlin Cargo, MD   81 mg at 04/30/19 0857    sodium chloride flush 0.9 % injection 10 mL  10 mL Intravenous 2 times per day Dalbert Curling, MD   10 mL at 04/30/19 0856    sodium chloride flush 0.9 % injection 10 mL  10 mL Intravenous PRN Dalbert Curling, MD        docusate sodium (COLACE) capsule 100 mg  100 mg Oral BID Dalbert Curling, MD   100 mg at 04/30/19 0857    ondansetron (ZOFRAN) injection 4 mg  4 mg Intravenous Q6H PRN Dalbert Curling, MD   4 mg at 04/28/19 2030    acetaminophen (TYLENOL) tablet 650 mg  650 mg Oral 4 times per day Dalbert Curling, MD   650 mg at 04/30/19 1204    traMADol (ULTRAM) tablet 50 mg  50 mg Oral 4 times per day Dalbert Curling, MD   50 mg at 04/30/19 1204    diazepam (VALIUM) tablet 2 mg  2 mg Oral Q6H PRN Dalbert Curling, MD        ipratropium-albuterol (DUONEB) nebulizer solution 3 mL  1 vial Inhalation Q4H PRN Dalbert Curling, MD        levothyroxine (SYNTHROID) tablet 75 mcg  75 mcg Oral Daily Dalbert Curling, MD   75 mcg at 04/30/19 0857    magnesium oxide (MAG-OX) tablet 400 mg  400 mg Oral Daily cardioverter/defibrillator) present [Z95.810] 10/02/2014    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 Xarelto to restart today  - AICD   - CKD stage 3: stable  - COPD: stable. Continue breathing treatments.    - Hypothyroid: synthroid 75 mcg daily.   - Hypokalemia: resolved      DVT prophylaxis:as per primary    Christian Cox DO [place]

## 2021-12-21 ENCOUNTER — TRANSCRIPTION ENCOUNTER (OUTPATIENT)
Age: 81
End: 2021-12-21

## 2021-12-21 VITALS — TEMPERATURE: 98 F

## 2021-12-21 PROBLEM — I48.91 UNSPECIFIED ATRIAL FIBRILLATION: Chronic | Status: ACTIVE | Noted: 2021-12-20

## 2021-12-21 LAB
ANION GAP SERPL CALC-SCNC: 10 MMOL/L — SIGNIFICANT CHANGE UP (ref 5–17)
BUN SERPL-MCNC: 14 MG/DL — SIGNIFICANT CHANGE UP (ref 7–23)
CALCIUM SERPL-MCNC: 9 MG/DL — SIGNIFICANT CHANGE UP (ref 8.4–10.5)
CHLORIDE SERPL-SCNC: 110 MMOL/L — HIGH (ref 96–108)
CO2 SERPL-SCNC: 23 MMOL/L — SIGNIFICANT CHANGE UP (ref 22–31)
CREAT SERPL-MCNC: 0.92 MG/DL — SIGNIFICANT CHANGE UP (ref 0.5–1.3)
GLUCOSE SERPL-MCNC: 96 MG/DL — SIGNIFICANT CHANGE UP (ref 70–99)
HCT VFR BLD CALC: 43.4 % — SIGNIFICANT CHANGE UP (ref 34.5–45)
HGB BLD-MCNC: 14.1 G/DL — SIGNIFICANT CHANGE UP (ref 11.5–15.5)
MAGNESIUM SERPL-MCNC: 2 MG/DL — SIGNIFICANT CHANGE UP (ref 1.6–2.6)
MCHC RBC-ENTMCNC: 32.5 GM/DL — SIGNIFICANT CHANGE UP (ref 32–36)
MCHC RBC-ENTMCNC: 34.6 PG — HIGH (ref 27–34)
MCV RBC AUTO: 106.6 FL — HIGH (ref 80–100)
NRBC # BLD: 0 /100 WBCS — SIGNIFICANT CHANGE UP (ref 0–0)
PLATELET # BLD AUTO: 223 K/UL — SIGNIFICANT CHANGE UP (ref 150–400)
POTASSIUM SERPL-MCNC: 4.3 MMOL/L — SIGNIFICANT CHANGE UP (ref 3.5–5.3)
POTASSIUM SERPL-SCNC: 4.3 MMOL/L — SIGNIFICANT CHANGE UP (ref 3.5–5.3)
RBC # BLD: 4.07 M/UL — SIGNIFICANT CHANGE UP (ref 3.8–5.2)
RBC # FLD: 13.1 % — SIGNIFICANT CHANGE UP (ref 10.3–14.5)
SODIUM SERPL-SCNC: 143 MMOL/L — SIGNIFICANT CHANGE UP (ref 135–145)
WBC # BLD: 5.71 K/UL — SIGNIFICANT CHANGE UP (ref 3.8–10.5)
WBC # FLD AUTO: 5.71 K/UL — SIGNIFICANT CHANGE UP (ref 3.8–10.5)

## 2021-12-21 PROCEDURE — 85025 COMPLETE CBC W/AUTO DIFF WBC: CPT

## 2021-12-21 PROCEDURE — 82248 BILIRUBIN DIRECT: CPT

## 2021-12-21 PROCEDURE — 80151 DRUG ASSAY AMIODARONE: CPT

## 2021-12-21 PROCEDURE — 85027 COMPLETE CBC AUTOMATED: CPT

## 2021-12-21 PROCEDURE — 99223 1ST HOSP IP/OBS HIGH 75: CPT

## 2021-12-21 PROCEDURE — 87635 SARS-COV-2 COVID-19 AMP PRB: CPT

## 2021-12-21 PROCEDURE — 85610 PROTHROMBIN TIME: CPT

## 2021-12-21 PROCEDURE — C1764: CPT

## 2021-12-21 PROCEDURE — 36415 COLL VENOUS BLD VENIPUNCTURE: CPT

## 2021-12-21 PROCEDURE — 80061 LIPID PANEL: CPT

## 2021-12-21 PROCEDURE — 81003 URINALYSIS AUTO W/O SCOPE: CPT

## 2021-12-21 PROCEDURE — 84484 ASSAY OF TROPONIN QUANT: CPT

## 2021-12-21 PROCEDURE — 99239 HOSP IP/OBS DSCHRG MGMT >30: CPT

## 2021-12-21 PROCEDURE — 82550 ASSAY OF CK (CPK): CPT

## 2021-12-21 PROCEDURE — 71045 X-RAY EXAM CHEST 1 VIEW: CPT

## 2021-12-21 PROCEDURE — C8929: CPT

## 2021-12-21 PROCEDURE — 83690 ASSAY OF LIPASE: CPT

## 2021-12-21 PROCEDURE — 83880 ASSAY OF NATRIURETIC PEPTIDE: CPT

## 2021-12-21 PROCEDURE — 99285 EMERGENCY DEPT VISIT HI MDM: CPT

## 2021-12-21 PROCEDURE — 33285 INSJ SUBQ CAR RHYTHM MNTR: CPT

## 2021-12-21 PROCEDURE — 83735 ASSAY OF MAGNESIUM: CPT

## 2021-12-21 PROCEDURE — 82553 CREATINE MB FRACTION: CPT

## 2021-12-21 PROCEDURE — 80053 COMPREHEN METABOLIC PANEL: CPT

## 2021-12-21 PROCEDURE — 85730 THROMBOPLASTIN TIME PARTIAL: CPT

## 2021-12-21 PROCEDURE — 84443 ASSAY THYROID STIM HORMONE: CPT

## 2021-12-21 PROCEDURE — 93005 ELECTROCARDIOGRAM TRACING: CPT

## 2021-12-21 PROCEDURE — 80048 BASIC METABOLIC PNL TOTAL CA: CPT

## 2021-12-21 RX ORDER — LOSARTAN POTASSIUM 100 MG/1
100 TABLET, FILM COATED ORAL DAILY
Refills: 0 | Status: DISCONTINUED | OUTPATIENT
Start: 2021-12-22 | End: 2021-12-21

## 2021-12-21 RX ORDER — LOSARTAN POTASSIUM 100 MG/1
1 TABLET, FILM COATED ORAL
Qty: 30 | Refills: 0
Start: 2021-12-21 | End: 2022-01-19

## 2021-12-21 RX ORDER — LOSARTAN POTASSIUM 100 MG/1
1 TABLET, FILM COATED ORAL
Qty: 0 | Refills: 0 | DISCHARGE

## 2021-12-21 RX ORDER — LOSARTAN POTASSIUM 100 MG/1
50 TABLET, FILM COATED ORAL ONCE
Refills: 0 | Status: COMPLETED | OUTPATIENT
Start: 2021-12-21 | End: 2021-12-21

## 2021-12-21 RX ADMIN — Medication 25 MILLIGRAM(S): at 06:24

## 2021-12-21 RX ADMIN — Medication 20 MILLIGRAM(S): at 06:24

## 2021-12-21 RX ADMIN — PANTOPRAZOLE SODIUM 40 MILLIGRAM(S): 20 TABLET, DELAYED RELEASE ORAL at 06:24

## 2021-12-21 RX ADMIN — Medication 81 MILLIGRAM(S): at 09:47

## 2021-12-21 RX ADMIN — LOSARTAN POTASSIUM 50 MILLIGRAM(S): 100 TABLET, FILM COATED ORAL at 06:24

## 2021-12-21 NOTE — PROGRESS NOTE ADULT - SUBJECTIVE AND OBJECTIVE BOX
EPS Progress Note    S:   - freq APCs on tele review  - scheduled for ILR today    O: T(C): 36.7 (12-21-21 @ 15:13), Max: 36.9 (12-20-21 @ 17:18)  HR: 86 (12-21-21 @ 14:07) (76 - 87)  BP: 136/84 (12-21-21 @ 14:07) (121/69 - 164/86)  RR: 18 (12-21-21 @ 14:07) (16 - 18)  SpO2: 96% (12-21-21 @ 14:07) (96% - 99%)    TELE: SR with freq APCs.    PHYSICAL  General:  NAD        Chest:  CTA B/L, no w/r/r  Cardiac:  RRR, + s1/s2 , no m/g/r  Abdomen:   soft ND/NT  Extremities: No edema, b/l groin no hematoma/bleeding/oozing  Skin: no rash noted, normal color and pigmentation  Psych: A&Ox3, normal affect and mood  Neuro: no deficit noted     LABS:                        14.1   5.71  )-----------( 223      ( 21 Dec 2021 07:10 )             43.4     143  |  110<H>  |  14  ----------------------------<  96  4.3   |  23  |  0.92    Ca    9.0      21 Dec 2021 07:10  Mg     2.0     12-21    TPro  6.0  /  Alb  3.6  /  TBili  0.4  /  DBili  0.2  /  AST  19  /  ALT  12  /  AlkPhos  61  12-20    PT/INR - ( 20 Dec 2021 06:37 )   PT: 11.9 sec;   INR: 0.99     PTT - ( 20 Dec 2021 06:37 )  PTT:35.1 sec    MEDICATIONS:  aspirin enteric coated 81 milliGRAM(s) Oral daily  furosemide    Tablet 20 milliGRAM(s) Oral daily  metoprolol succinate ER 25 milliGRAM(s) Oral two times a day  pantoprazole    Tablet 40 milliGRAM(s) Oral before breakfast    ASSESSMENT & PLAN  Ms. Bowles is an 80 yo F with HTN, HLD, non obstructive CAD w/normal EF on 3/30/21, h/o severe MR s/p Mitral Valve Repair and PFO closure via right mini thoracotomy on 3/31/21 w/ post op episode of AFib (not captured via tele/ekg) in which pt was tx'ed w/ Amio and BB converting back to NSR (d/c Amio in May 2021) who presented to [facility] ED 12/20/21, c/o multiple brief episodes of palpitations w/ dizziness that wake her up at night, recently episodes are recurring more frequently. No tele or EKG supports Afib since admission. Tele does show SR with frequent APCs and runs of AT. EP consulted for Loop yesterday, now going for ILR today.    - continue metoprolol ER 25mg BID with extra dose to be taken during episodes of palpitations  - no AC recommended at present; No documented evidence of A fib (since 2 episodes post op Afib in March 2021)  - plan for ILR today  - f/u appt with EP Dr. Gee 2/3/22 @ 10AM        
CARDIOLOGY NP PROGRESS NOTE    Subjective: Pt seen and examined at bedside. Reports feeling well. Has intermittent episodes of palpitations/dizziness over the last few days. Reports at cardiac rehab had elevated HR 100s and outpatient cardiologist Dr Barr increased home Metoprolol 25mg QD to BID.  Remainder ROS otherwise negative.    Overnight Events: None    TELEMETRY: SR 80s w/ frequent PACs. No Afib episodes noted on admission EKG or tele review. Multiple episodes of frequent PACs and PAT w/ HR 140s.    EKG: NSR w/ PACs      VITAL SIGNS:  T(C): 36.6 (12-20-21 @ 13:13), Max: 37 (12-20-21 @ 06:08)  HR: 85 (12-20-21 @ 14:00) (78 - 122)  BP: 139/86 (12-20-21 @ 14:00) (139/86 - 161/86)  RR: 18 (12-20-21 @ 14:00) (18 - 18)  SpO2: 98% (12-20-21 @ 14:00) (96% - 98%)  Wt(kg): --    I&O's Summary    20 Dec 2021 07:01  -  20 Dec 2021 17:01  --------------------------------------------------------  IN: 240 mL / OUT: 200 mL / NET: 40 mL          PHYSICAL EXAM:    General: A/ox 3, No acute Distress  Neck: Supple, NO JVD  Cardiac: S1 S2, No M/R/G  Pulmonary: CTAB, Breathing unlabored, No Rhonchi/Rales/Wheezing  Abdomen: Soft, Non -tender, +BS x 4 quads  Extremities: No Rashes, No edema. 2+ peripheral pulses mayra  Neuro: A/o x 3, No focal deficits          LABS:                          13.0   7.70  )-----------( 206      ( 20 Dec 2021 06:37 )             39.3                              12-20    146<H>  |  109<H>  |  17  ----------------------------<  98  3.5   |  23  |  0.98    Ca    8.7      20 Dec 2021 06:37  Mg     1.8     12-20    TPro  6.0  /  Alb  3.6  /  TBili  0.4  /  DBili  0.2  /  AST  19  /  ALT  12  /  AlkPhos  61  12-20    LIVER FUNCTIONS - ( 20 Dec 2021 06:37 )  Alb: 3.6 g/dL / Pro: 6.0 g/dL / ALK PHOS: 61 U/L / ALT: 12 U/L / AST: 19 U/L / GGT: x         PT/INR - ( 20 Dec 2021 06:37 )   PT: 11.9 sec;   INR: 0.99          PTT - ( 20 Dec 2021 06:37 )  PTT:35.1 sec  CAPILLARY BLOOD GLUCOSE        CARDIAC MARKERS ( 20 Dec 2021 06:37 )  x     / 0.01 ng/mL / 51 U/L / x     / 2.6 ng/mL  CARDIAC MARKERS ( 20 Dec 2021 01:36 )  x     / 0.01 ng/mL / x     / x     / x              Allergies:  No Known Allergies  opioid-like analgesics (Stomach Upset; Vomiting; Nausea)  Valium (Other)    MEDICATIONS  (STANDING):  aspirin enteric coated 81 milliGRAM(s) Oral daily  furosemide    Tablet 20 milliGRAM(s) Oral daily  losartan 50 milliGRAM(s) Oral daily  metoprolol tartrate 25 milliGRAM(s) Oral two times a day  pantoprazole    Tablet 40 milliGRAM(s) Oral before breakfast    MEDICATIONS  (PRN):        DIAGNOSTIC TESTS:     < from: TTE Echo Complete w/ Contrast w/ Doppler (12.20.21 @ 10:26) >  CONCLUSIONS:   1. Mildsymmetric left ventricular hypertrophy.   2. Normal left and right ventricular size and systolic function.   3. Aortic sclerosis without significant stenosis.   4. An annuloplasty ring is noted in the mitral position. The mean transvalvular gradient is 3.30 mmHg at a heart rate of 73 bpm.   5. At least mild-to-moderate eccentric mitral regurgitation visualized with echo contrast.   6. Pulmonary hypertension present, pulmonary artery systolic pressure is 35 mmHg.   7. No pericardial effusion.    < end of copied text >

## 2021-12-21 NOTE — DISCHARGE NOTE PROVIDER - NSDCMRMEDTOKEN_GEN_ALL_CORE_FT
Aspirin Enteric Coated 81 mg oral delayed release tablet: 1 tab(s) orally once a day  furosemide 20 mg oral tablet: 1 tab(s) orally once a day  losartan 50 mg oral tablet: 1 tab(s) orally once a day  metoprolol succinate 25 mg oral tablet, extended release: 1 tab(s) orally 2 times a day  Multiple Vitamins oral capsule: 1 cap(s) orally once a day  omeprazole 20 mg oral delayed release capsule: 1 cap(s) orally once a day   Aspirin Enteric Coated 81 mg oral delayed release tablet: 1 tab(s) orally once a day  furosemide 20 mg oral tablet: 1 tab(s) orally once a day  losartan 100 mg oral tablet: 1 tab(s) orally once a day  metoprolol succinate 25 mg oral tablet, extended release: 1 tab(s) orally 2 times a day  Multiple Vitamins oral capsule: 1 cap(s) orally once a day  omeprazole 20 mg oral delayed release capsule: 1 cap(s) orally once a day

## 2021-12-21 NOTE — DISCHARGE NOTE PROVIDER - REASON FOR ADMISSION
Afib RvR   symptoms of dyspnea, b/l LE edema, dizziness palpitaitons Afib RvR   symptoms of dyspnea, b/l LE edema, dizziness palpitations

## 2021-12-21 NOTE — DISCHARGE NOTE PROVIDER - NSDCCPCAREPLAN_GEN_ALL_CORE_FT
PRINCIPAL DISCHARGE DIAGNOSIS  Diagnosis: PAT (paroxysmal atrial tachycardia)  Assessment and Plan of Treatment: You came into the hospital with symptoms of palpitations and dizziness and was admitted to the cardiac telemetry floor for monitoring. On telemetry, you were found to have atrial tachycardia, which means the top chambers of the heart beat at at regular, but fast rate. You were seen by the heart rhythm doctor (Electrophysiologist) and was recommended to have an implantable loop recorder placement to monitoring your heart rhythm long term. You may shower tonight with the dressing in place. You may take off the clear plastic dressing tomorrow 12/22/21. There is skin glue at the site that will fall off on its own. DO NOT pick at the site or skin glue. Expect to have mild soreness at the site, you can take Tylenol as needed for mild pain.  -Continue taking your home medication Metoprolol 25mg twice a day. If you start to feel symptomatic with palpitations you may take an extra dose of the Metoprolol to help try to lower the heart rate.      SECONDARY DISCHARGE DIAGNOSES  Diagnosis: HTN (hypertension)  Assessment and Plan of Treatment: Continue taking blood pressure medication Losartan and Metoprolol.     PRINCIPAL DISCHARGE DIAGNOSIS  Diagnosis: PAT (paroxysmal atrial tachycardia)  Assessment and Plan of Treatment: You came into the hospital with symptoms of palpitations and dizziness and was admitted to the cardiac telemetry floor for monitoring. On telemetry, you were found to have atrial tachycardia, which means the top chambers of the heart beat at at regular, but fast rate. You were seen by the heart rhythm doctor (Electrophysiologist) and was recommended to have an implantable loop recorder placement to monitoring your heart rhythm long term. You may shower tonight with the dressing in place. You may take off the clear plastic dressing tomorrow 12/22/21. There is skin glue at the site that will fall off on its own. DO NOT pick at the site or skin glue. Expect to have mild soreness at the site, you can take Tylenol as needed for mild pain.  -Continue taking your home medication Metoprolol 25mg twice a day. If you start to feel symptomatic with palpitations you may take an extra dose of the Metoprolol to help try to lower the heart rate.      SECONDARY DISCHARGE DIAGNOSES  Diagnosis: HTN (hypertension)  Assessment and Plan of Treatment: Your blood pressure was found to be elevated while in the hospital. INCREASE your home Losartan from 50mg to 100mg once a day in the evening. Continue taking Metoprolol at the current dose.

## 2021-12-21 NOTE — PROGRESS NOTE ADULT - REASON FOR ADMISSION
Afib RvR   symptoms of dyspnea, b/l LE edema, dizziness palpitatons
Afib RvR   symptoms of dyspnea, b/l LE edema, dizziness palpitaitons

## 2021-12-21 NOTE — DISCHARGE NOTE PROVIDER - PROVIDER TOKENS
PROVIDER:[TOKEN:[[id number]],FOLLOWUP:[2 weeks],ESTABLISHEDPATIENT:[T]],PROVIDER:[TOKEN:[[id number]],FOLLOWUP:[1 month]] PROVIDER:[TOKEN:[[id number]],FOLLOWUP:[2 weeks],ESTABLISHEDPATIENT:[T]],PROVIDER:[TOKEN:[[id number]],SCHEDULEDAPPT:[02/03/2022],SCHEDULEDAPPTTIME:[10:00 AM]]

## 2021-12-21 NOTE — DISCHARGE NOTE PROVIDER - HOSPITAL COURSE
82 y/o female with PMHx of COVID-19 (9/2021), HTN, HLD, non-obstructive CAD with normal EF on 3/30/21 @[facility] (CTS clearance) hx of severe MR s/p Mitral Valve Repair and PFO closure via right mini thoracotomy w/ Dr Wilcox 3/31/21 @[facility] w/ post op day 2 episode of Atrial Fibrillation in which patient was treated with Amiodarone x 2mos and BB (no AC).  Pt presents to [facility] ER 12/20/21 c/o dyspnea on exertion going up stairs (unchanged since MVR) and intermittent palpitations/dizziness x few days. Reportedly had intermittent self resolving ?PAF while in ED (no recordings available). No Afib noted on tele review or EKG. Admitted to cardiac tele for further management. EP consulted for ILR placement. 82 y/o female with PMHx of COVID-19 (9/2021), HTN, HLD, non-obstructive CAD with normal EF on 3/30/21 @[facility] (CTS clearance) hx of severe MR s/p Mitral Valve Repair and PFO closure via right mini thoracotomy w/ Dr Wilcox 3/31/21 @[facility] w/ post op day 2 episode of Atrial Fibrillation in which patient was treated with Amiodarone x 2mos and BB (no AC).  Pt presents to [facility] ER 12/20/21 c/o dyspnea on exertion going up stairs (unchanged since MVR) and intermittent palpitations/dizziness x few days. Reportedly had intermittent self resolving ?PAF while in ED (no recordings available for review). No Afib noted on tele review. Admission EKG shows ST 100s w/ PACs.  ECHO revealed EF 55-60%, mild conc LVH, mild-moderate MR, Mitral annuloplasty ring, mean transvalvular gradient 3.3mm w/ HR 73bpm. Admitted to cardiac tele for further management. Pt monitored on telemetry, found to have SR 70s w/ frequent PACs, and intermittent short episodes of PAT. EP was consulted. NO indication to start anticoagulation given no evidence of documented Afib. Per EP will c/w Metoprolol Succinate 25mg PO BID (pt instructed to take extra dose if symptomatic w/ palpitations at home). Pt underwent loop recorder placement w/ EP on 12/21/21. ECHO revealed EF 55-60%, mild conc LVH, mild-moderate MR, Mitral annuloplasty ring, mean transvalvular gradient 3.3mm w/ HR 73bpm.     On the day of discharge, the patient was seen and examined. Symptoms improved. Vital signs are stable. Labs and imaging reviewed. Patient is medically optimized and hemodynamically stable. Return precautions discussed, medication teach back done, and importance of physician followup emphasized. The patient verbalized understanding. 82 y/o female with PMHx of COVID-19 (9/2021), HTN, HLD, non-obstructive CAD with normal EF on 3/30/21 @[facility] (CTS clearance) hx of severe MR s/p Mitral Valve Repair and PFO closure via right mini thoracotomy w/ Dr Wilcox 3/31/21 @[facility] w/ post op day 2 episode of Atrial Fibrillation in which patient was treated with Amiodarone x 2mos and BB (no AC).  Pt presents to [facility] ER 12/20/21 c/o dyspnea on exertion going up stairs (unchanged since MVR) and intermittent palpitations/dizziness x few days. Reportedly had intermittent self resolving ?PAF while in ED (no recordings available for review). No Afib noted on tele review. Admission EKG shows ST 100s w/ PACs.  ECHO revealed EF 55-60%, mild conc LVH, mild-moderate MR, Mitral annuloplasty ring, mean transvalvular gradient 3.3mm w/ HR 73bpm. Admitted to cardiac tele for further management. Pt monitored on telemetry, found to have SR 70s w/ frequent PACs, and intermittent short episodes of PAT. EP was consulted. NO indication to start anticoagulation given no evidence of documented Afib. Per EP will c/w Metoprolol Succinate 25mg PO BID (pt instructed to take extra dose if symptomatic w/ palpitations at home). Pt underwent loop recorder placement w/ EP on 12/21/21. ECHO revealed EF 55-60%, mild conc LVH, mild-moderate MR, Mitral annuloplasty ring, mean transvalvular gradient 3.3mm w/ HR 73bpm. Pt was found to be hypertensive -160s during hospitalization. Home med Losartan 50mg was increased to 100mg qd for BP control.    On the day of discharge, the patient was seen and examined. Symptoms improved. Vital signs are stable. Labs and imaging reviewed. Patient is medically optimized and hemodynamically stable. Return precautions discussed, medication teach back done, and importance of physician followup emphasized. The patient verbalized understanding.

## 2021-12-21 NOTE — DISCHARGE NOTE NURSING/CASE MANAGEMENT/SOCIAL WORK - PATIENT PORTAL LINK FT
You can access the FollowMyHealth Patient Portal offered by [facility] by registering at the following website: [email or web address]. By joining [email or web address]’s FollowMyHealth portal, you will also be able to view your health information using other applications (apps) compatible with our system.

## 2021-12-21 NOTE — DISCHARGE NOTE PROVIDER - CARE PROVIDER_API CALL
Caren Barr)  Cardiovascular Disease; Internal Medicine  [address] [address]  Phone: [phone]  Fax: [phone]  Established Patient  Follow Up Time: 2 weeks    Pritesh Gee)  Cardiac Electrophysiology; Cardiovascular Disease; Internal Medicine  [address] [address]  Phone: [phone]  Fax: [phone]  Follow Up Time: 1 month   Caren Barr)  Cardiovascular Disease; Internal Medicine  [address] [address]  Phone: [phone]  Fax: [phone]  Established Patient  Follow Up Time: 2 weeks    Pritesh Gee)  Cardiac Electrophysiology; Cardiovascular Disease; Internal Medicine  [address] [address]  Phone: [phone]  Fax: [phone]  Scheduled Appointment: 02/03/2022 10:00 AM

## 2021-12-21 NOTE — DISCHARGE NOTE NURSING/CASE MANAGEMENT/SOCIAL WORK - NSDCPEFALRISK_GEN_ALL_CORE
For information on Fall & Injury Prevention, visit: [email or web address] OR  [email or web address] OR  https://www.cdc.gov/steadi/patient.html

## 2021-12-26 LAB
AMIODARONE FLD-MCNC: <100 NG/ML — LOW (ref 1000–2500)
AMIODARONE+DESETH SERPL-MCNC: <100 NG/ML — SIGNIFICANT CHANGE UP

## 2022-01-31 ENCOUNTER — APPOINTMENT (OUTPATIENT)
Dept: HEART AND VASCULAR | Facility: CLINIC | Age: 82
End: 2022-01-31
Payer: MEDICARE

## 2022-01-31 ENCOUNTER — NON-APPOINTMENT (OUTPATIENT)
Age: 82
End: 2022-01-31

## 2022-01-31 PROCEDURE — G2066: CPT

## 2022-01-31 PROCEDURE — 93298 REM INTERROG DEV EVAL SCRMS: CPT

## 2022-02-03 ENCOUNTER — APPOINTMENT (OUTPATIENT)
Dept: HEART AND VASCULAR | Facility: CLINIC | Age: 82
End: 2022-02-03
Payer: MEDICARE

## 2022-02-03 VITALS
BODY MASS INDEX: 27.32 KG/M2 | WEIGHT: 170 LBS | HEART RATE: 83 BPM | TEMPERATURE: 97.2 F | SYSTOLIC BLOOD PRESSURE: 178 MMHG | HEIGHT: 66 IN | DIASTOLIC BLOOD PRESSURE: 88 MMHG

## 2022-02-03 PROCEDURE — 93291 INTERROG DEV EVAL SCRMS IP: CPT

## 2022-02-11 NOTE — HISTORY OF PRESENT ILLNESS
[de-identified] : 81 year old female with COVID 9/2021, HTN, HLD, non obstructive CAD, severe MR s/p MVR and PFO closure 3/2021 who was admitted to [facility] 12/2021 with paroxysmal atrial tachycardia s/p ILR who presents for follow up.\par \par She underwent for paroxysmal atrial tachycardia and placed on Toprol.  ILR placed.  Echo with a normal EF. She denies any chest pain, SOB, syncope, near syncope, orthopnea, PND.  No device related complaints.

## 2022-02-11 NOTE — DISCUSSION/SUMMARY
[FreeTextEntry1] : 81 year old female with COVID 9/2021, HTN, HLD, non obstructive CAD, severe MR s/p MVR and PFO closure 3/2021 who was admitted to [facility] 12/2021 with paroxysmal atrial tachycardia s/p ILR who presents for follow up.  ILR incision well healed.  Interrogation reveals no afib.  SHort bursts of AT.  No changes made today.  She will follow up in 6 months or sooner if needed and knows to call with any questions or concerns.

## 2022-02-11 NOTE — REVIEW OF SYSTEMS
[Negative] : Heme/Lymph [Fever] : no fever [Chills] : no chills [SOB] : no shortness of breath [Dyspnea on exertion] : not dyspnea during exertion [Chest Discomfort] : no chest discomfort [Palpitations] : no palpitations [Syncope] : no syncope

## 2022-02-11 NOTE — PROCEDURE
[de-identified] : medtronic REVEAL LINQ\par [id number]\par battery gppd\par sensing good\par short bursts of AT

## 2022-02-11 NOTE — ADDENDUM
[FreeTextEntry1] : I, Pritesh Gee, hereby attest that the medical record entry for this patient accurately reflects signatures/notations that I made on the Date of Service in my capacity as an Attending Physician when I treated/diagnosed the above patient. I do hereby attest that this information is true, accurate and complete to the best of my knowledge and I understand that any falsification, omission, or concealment of material fact may subject me to administrative, civil, or, criminal liability. I agree with the note as written by my PA in its entirety.\par I was present for the entire visit and supervised the entire visit and agree with the plan as outlined.\par \par

## 2022-02-17 ENCOUNTER — APPOINTMENT (OUTPATIENT)
Dept: PULMONOLOGY | Facility: CLINIC | Age: 82
End: 2022-02-17
Payer: MEDICARE

## 2022-02-17 VITALS
OXYGEN SATURATION: 97 % | TEMPERATURE: 97.2 F | SYSTOLIC BLOOD PRESSURE: 144 MMHG | BODY MASS INDEX: 27.32 KG/M2 | WEIGHT: 170 LBS | DIASTOLIC BLOOD PRESSURE: 89 MMHG | HEIGHT: 66 IN | HEART RATE: 75 BPM

## 2022-02-17 DIAGNOSIS — R06.00 DYSPNEA, UNSPECIFIED: ICD-10-CM

## 2022-02-17 PROCEDURE — 99204 OFFICE O/P NEW MOD 45 MIN: CPT

## 2022-02-17 NOTE — PHYSICAL EXAM
[Supple] : supple [Normal S1, S2] : normal s1, s2 [No Resp Distress] : no resp distress [Clear to Auscultation Bilaterally] : clear to auscultation bilaterally [Gait - Sufficient For Exercise Testing] : gait sufficient for exercise testing [Oriented x3] : oriented x3 [Normal Affect] : normal affect [TextBox_2] : appears younger than stated age  [TextBox_54] : systolic murmur

## 2022-02-17 NOTE — HISTORY OF PRESENT ILLNESS
[TextBox_4] : 80 yo F former smoker of 10 pack years, quit over 20 years ago with past medical history of HTN, covid-19 in  (mild),  MVI s/p MV repair in 3-2021 and PFO closer here for evaluation of shortness of breath with exertion. For the last 2 years, noted dyspnea when climbing up a flight of stairs. This is what prompted eval for cardiac etiologies and she underwent a MV repair. Since her surgery she has been attending cardiac rehab, notes improvement in her walking on the treadmill but feels no improvement in dyspnea when climbing up stairs. no past pulmonary history and no other reapiratory complains. \par \par SH: former smoker. works as an artist (painting, scupting).

## 2022-02-17 NOTE — ASSESSMENT
[FreeTextEntry1] : Data Reviewed: \par CXR  12/20/2021\par COMPARISON: April 13, 2021.\par Stable cardiomegaly, mitral annuloplasty. Lungs and mediastinum are unremarkable. Stable bony structures. Left rib old fractures\par \par ECHO \par  1. Mild symmetric left ventricular hypertrophy.\par  2. Normal left and right ventricular size and systolic function.\par  3. Aortic sclerosis without significant stenosis.\par  4. An annuloplasty ring is noted in the mitral position. The mean transvalvular gradient is 3.30 mmHg at a heart rate of 73 bpm.\par  5. At least mild-to-moderate eccentric mitral regurgitation visualized with echo contrast.\par  6. Pulmonary hypertension present, pulmonary artery systolic pressure is 35 mmHg.\par  7. No pericardial effusion.\par \par Impression/plan:\par 1. Dyspnea on exertion - mild\par probable causes are deconditioning, obstruction lung disease and possible pulmonary hypertension. She does have mildly elevated PASP on TTE but normal RV function\par - will start with pft\par - c/w cardiac rehab\par - will possibly need CPET\par \par 2. HCM\par received covid-19 vaccine and booster ()

## 2022-02-17 NOTE — CONSULT LETTER
[Dear  ___] : Dear  [unfilled], [Consult Letter:] : I had the pleasure of evaluating your patient, [unfilled]. [Please see my note below.] : Please see my note below. [Consult Closing:] : Thank you very much for allowing me to participate in the care of this patient.  If you have any questions, please do not hesitate to contact me. [Sincerely,] : Sincerely, [DrJem  ___] : Dr. NÚÑEZ [FreeTextEntry3] : Dr. Sandoval

## 2022-02-22 ENCOUNTER — APPOINTMENT (OUTPATIENT)
Dept: PULMONOLOGY | Facility: CLINIC | Age: 82
End: 2022-02-22
Payer: MEDICARE

## 2022-02-22 LAB — SARS-COV-2 N GENE NPH QL NAA+PROBE: NOT DETECTED

## 2022-02-22 PROCEDURE — 94060 EVALUATION OF WHEEZING: CPT

## 2022-02-22 PROCEDURE — 94727 GAS DIL/WSHOT DETER LNG VOL: CPT

## 2022-02-22 PROCEDURE — ZZZZZ: CPT

## 2022-02-22 PROCEDURE — 94729 DIFFUSING CAPACITY: CPT

## 2022-03-03 ENCOUNTER — NON-APPOINTMENT (OUTPATIENT)
Age: 82
End: 2022-03-03

## 2022-03-07 ENCOUNTER — APPOINTMENT (OUTPATIENT)
Dept: HEART AND VASCULAR | Facility: CLINIC | Age: 82
End: 2022-03-07
Payer: MEDICARE

## 2022-03-07 ENCOUNTER — NON-APPOINTMENT (OUTPATIENT)
Age: 82
End: 2022-03-07

## 2022-03-07 PROCEDURE — 93298 REM INTERROG DEV EVAL SCRMS: CPT

## 2022-03-07 PROCEDURE — G2066: CPT

## 2022-04-11 ENCOUNTER — NON-APPOINTMENT (OUTPATIENT)
Age: 82
End: 2022-04-11

## 2022-04-11 ENCOUNTER — APPOINTMENT (OUTPATIENT)
Dept: HEART AND VASCULAR | Facility: CLINIC | Age: 82
End: 2022-04-11
Payer: MEDICARE

## 2022-04-11 PROCEDURE — 93298 REM INTERROG DEV EVAL SCRMS: CPT

## 2022-04-11 PROCEDURE — G2066: CPT

## 2022-05-16 ENCOUNTER — APPOINTMENT (OUTPATIENT)
Dept: HEART AND VASCULAR | Facility: CLINIC | Age: 82
End: 2022-05-16
Payer: MEDICARE

## 2022-05-16 ENCOUNTER — NON-APPOINTMENT (OUTPATIENT)
Age: 82
End: 2022-05-16

## 2022-05-16 PROCEDURE — 93298 REM INTERROG DEV EVAL SCRMS: CPT

## 2022-05-16 PROCEDURE — G2066: CPT

## 2022-06-20 ENCOUNTER — NON-APPOINTMENT (OUTPATIENT)
Age: 82
End: 2022-06-20

## 2022-06-20 ENCOUNTER — APPOINTMENT (OUTPATIENT)
Dept: HEART AND VASCULAR | Facility: CLINIC | Age: 82
End: 2022-06-20
Payer: MEDICARE

## 2022-06-20 PROCEDURE — 93298 REM INTERROG DEV EVAL SCRMS: CPT

## 2022-06-20 PROCEDURE — G2066: CPT

## 2022-07-25 ENCOUNTER — APPOINTMENT (OUTPATIENT)
Dept: HEART AND VASCULAR | Facility: CLINIC | Age: 82
End: 2022-07-25

## 2022-08-03 ENCOUNTER — FORM ENCOUNTER (OUTPATIENT)
Age: 82
End: 2022-08-03

## 2022-08-04 ENCOUNTER — APPOINTMENT (OUTPATIENT)
Dept: HEART AND VASCULAR | Facility: CLINIC | Age: 82
End: 2022-08-04

## 2022-10-13 ENCOUNTER — NON-APPOINTMENT (OUTPATIENT)
Age: 82
End: 2022-10-13

## 2022-10-13 ENCOUNTER — APPOINTMENT (OUTPATIENT)
Dept: HEART AND VASCULAR | Facility: CLINIC | Age: 82
End: 2022-10-13

## 2022-10-13 PROCEDURE — 93298 REM INTERROG DEV EVAL SCRMS: CPT

## 2022-10-13 PROCEDURE — G2066: CPT

## 2022-11-17 ENCOUNTER — APPOINTMENT (OUTPATIENT)
Dept: HEART AND VASCULAR | Facility: CLINIC | Age: 82
End: 2022-11-17

## 2022-11-17 ENCOUNTER — NON-APPOINTMENT (OUTPATIENT)
Age: 82
End: 2022-11-17

## 2022-11-17 PROCEDURE — 93298 REM INTERROG DEV EVAL SCRMS: CPT

## 2022-11-17 PROCEDURE — G2066: CPT

## 2022-12-13 ENCOUNTER — APPOINTMENT (OUTPATIENT)
Dept: ORTHOPEDIC SURGERY | Facility: CLINIC | Age: 82
End: 2022-12-13

## 2022-12-13 VITALS — WEIGHT: 170 LBS | RESPIRATION RATE: 16 BRPM | BODY MASS INDEX: 27.32 KG/M2 | HEIGHT: 66 IN

## 2022-12-13 DIAGNOSIS — G81.94 HEMIPLEGIA, UNSPECIFIED AFFECTING LEFT NONDOMINANT SIDE: ICD-10-CM

## 2022-12-13 PROCEDURE — 73030 X-RAY EXAM OF SHOULDER: CPT | Mod: LT

## 2022-12-13 PROCEDURE — 99204 OFFICE O/P NEW MOD 45 MIN: CPT

## 2022-12-13 RX ORDER — NITROFURANTOIN (MONOHYDRATE/MACROCRYSTALS) 25; 75 MG/1; MG/1
100 CAPSULE ORAL
Qty: 9 | Refills: 0 | Status: ACTIVE | COMMUNITY
Start: 2022-11-16

## 2022-12-13 RX ORDER — CLOPIDOGREL BISULFATE 75 MG/1
75 TABLET, FILM COATED ORAL
Qty: 90 | Refills: 0 | Status: ACTIVE | COMMUNITY
Start: 2022-09-29

## 2022-12-13 RX ORDER — CLOBETASOL PROPIONATE 0.5 MG/G
0.05 CREAM TOPICAL
Qty: 60 | Refills: 0 | Status: ACTIVE | COMMUNITY
Start: 2022-01-18

## 2022-12-13 RX ORDER — ATORVASTATIN CALCIUM 80 MG/1
80 TABLET, FILM COATED ORAL
Qty: 90 | Refills: 0 | Status: ACTIVE | COMMUNITY
Start: 2022-07-06

## 2022-12-13 RX ORDER — AMLODIPINE BESYLATE 2.5 MG/1
2.5 TABLET ORAL
Qty: 90 | Refills: 0 | Status: ACTIVE | COMMUNITY
Start: 2022-09-29

## 2022-12-13 RX ORDER — TRAZODONE HYDROCHLORIDE 50 MG/1
50 TABLET ORAL
Qty: 90 | Refills: 0 | Status: ACTIVE | COMMUNITY
Start: 2022-09-29

## 2022-12-13 RX ORDER — PANTOPRAZOLE 40 MG/1
40 TABLET, DELAYED RELEASE ORAL
Qty: 90 | Refills: 0 | Status: ACTIVE | COMMUNITY
Start: 2022-09-29

## 2022-12-13 RX ORDER — NITROFURANTOIN MACROCRYSTALS 100 MG/1
100 CAPSULE ORAL
Qty: 10 | Refills: 0 | Status: ACTIVE | COMMUNITY
Start: 2022-11-15

## 2022-12-13 RX ORDER — OXYBUTYNIN CHLORIDE 5 MG/1
5 TABLET ORAL
Qty: 30 | Refills: 0 | Status: ACTIVE | COMMUNITY
Start: 2022-08-29

## 2022-12-13 RX ORDER — ATORVASTATIN CALCIUM 40 MG/1
40 TABLET, FILM COATED ORAL
Qty: 90 | Refills: 0 | Status: ACTIVE | COMMUNITY
Start: 2022-09-29

## 2022-12-13 RX ORDER — SULFAMETHOXAZOLE AND TRIMETHOPRIM 800; 160 MG/1; MG/1
800-160 TABLET ORAL
Qty: 10 | Refills: 0 | Status: ACTIVE | COMMUNITY
Start: 2022-11-18

## 2022-12-13 RX ORDER — METOPROLOL SUCCINATE 100 MG/1
100 TABLET, EXTENDED RELEASE ORAL
Qty: 90 | Refills: 0 | Status: ACTIVE | COMMUNITY
Start: 2022-09-29

## 2022-12-13 RX ORDER — LISINOPRIL 10 MG/1
10 TABLET ORAL
Qty: 90 | Refills: 0 | Status: ACTIVE | COMMUNITY
Start: 2022-07-06

## 2022-12-22 ENCOUNTER — NON-APPOINTMENT (OUTPATIENT)
Age: 82
End: 2022-12-22

## 2022-12-22 ENCOUNTER — APPOINTMENT (OUTPATIENT)
Dept: HEART AND VASCULAR | Facility: CLINIC | Age: 82
End: 2022-12-22
Payer: MEDICARE

## 2022-12-22 PROCEDURE — 93298 REM INTERROG DEV EVAL SCRMS: CPT

## 2022-12-22 PROCEDURE — G2066: CPT

## 2023-01-26 ENCOUNTER — APPOINTMENT (OUTPATIENT)
Dept: HEART AND VASCULAR | Facility: CLINIC | Age: 83
End: 2023-01-26
Payer: MEDICARE

## 2023-01-26 ENCOUNTER — NON-APPOINTMENT (OUTPATIENT)
Age: 83
End: 2023-01-26

## 2023-01-26 PROCEDURE — G2066: CPT

## 2023-01-26 PROCEDURE — 93298 REM INTERROG DEV EVAL SCRMS: CPT

## 2023-03-02 ENCOUNTER — NON-APPOINTMENT (OUTPATIENT)
Age: 83
End: 2023-03-02

## 2023-03-02 ENCOUNTER — APPOINTMENT (OUTPATIENT)
Dept: HEART AND VASCULAR | Facility: CLINIC | Age: 83
End: 2023-03-02
Payer: MEDICARE

## 2023-03-02 PROCEDURE — 93298 REM INTERROG DEV EVAL SCRMS: CPT

## 2023-03-02 PROCEDURE — G2066: CPT

## 2023-04-06 ENCOUNTER — NON-APPOINTMENT (OUTPATIENT)
Age: 83
End: 2023-04-06

## 2023-04-06 ENCOUNTER — APPOINTMENT (OUTPATIENT)
Dept: HEART AND VASCULAR | Facility: CLINIC | Age: 83
End: 2023-04-06
Payer: MEDICARE

## 2023-04-06 PROCEDURE — G2066: CPT

## 2023-04-06 PROCEDURE — 93298 REM INTERROG DEV EVAL SCRMS: CPT

## 2023-05-11 ENCOUNTER — APPOINTMENT (OUTPATIENT)
Dept: HEART AND VASCULAR | Facility: CLINIC | Age: 83
End: 2023-05-11
Payer: MEDICARE

## 2023-05-11 ENCOUNTER — NON-APPOINTMENT (OUTPATIENT)
Age: 83
End: 2023-05-11

## 2023-05-11 PROCEDURE — G2066: CPT

## 2023-05-11 PROCEDURE — 93298 REM INTERROG DEV EVAL SCRMS: CPT

## 2023-05-18 NOTE — ED ADULT NURSE NOTE - NSSEPSISSUSPECTED_ED_A_ED
Left message for pt to call back for results and to inform her of treatment. Numbers and hours provided.      No

## 2023-06-15 ENCOUNTER — NON-APPOINTMENT (OUTPATIENT)
Age: 83
End: 2023-06-15

## 2023-06-15 ENCOUNTER — APPOINTMENT (OUTPATIENT)
Dept: HEART AND VASCULAR | Facility: CLINIC | Age: 83
End: 2023-06-15
Payer: MEDICARE

## 2023-06-15 PROCEDURE — 93298 REM INTERROG DEV EVAL SCRMS: CPT

## 2023-06-15 PROCEDURE — G2066: CPT

## 2023-07-20 ENCOUNTER — APPOINTMENT (OUTPATIENT)
Dept: HEART AND VASCULAR | Facility: CLINIC | Age: 83
End: 2023-07-20
Payer: MEDICARE

## 2023-07-20 ENCOUNTER — NON-APPOINTMENT (OUTPATIENT)
Age: 83
End: 2023-07-20

## 2023-07-20 PROCEDURE — G2066: CPT

## 2023-07-20 PROCEDURE — 93298 REM INTERROG DEV EVAL SCRMS: CPT

## 2023-08-16 NOTE — H&P ADULT - NSHPLANGLIMITEDENGLISH_GEN_A_CORE
No Bilateral Helical Rim Advancement Flap Text: The defect edges were debeveled with a #15c blade scalpel.  Given the location of the defect and the proximity to free margins (helical rim) a bilateral helical rim advancement flap was deemed most appropriate.  Using a sterile surgical marker, the appropriate advancement flaps were drawn incorporating the defect and placing the expected incisions between the helical rim and antihelix where possible.  The area thus outlined was incised through and through with a #15 scalpel blade.  With a skin hook and iris scissors, the flaps were gently and sharply undermined and freed up.

## 2023-08-22 ENCOUNTER — APPOINTMENT (OUTPATIENT)
Dept: HEART AND VASCULAR | Facility: CLINIC | Age: 83
End: 2023-08-22
Payer: MEDICARE

## 2023-08-22 ENCOUNTER — NON-APPOINTMENT (OUTPATIENT)
Age: 83
End: 2023-08-22

## 2023-08-23 PROCEDURE — 93298 REM INTERROG DEV EVAL SCRMS: CPT

## 2023-08-23 PROCEDURE — G2066: CPT

## 2023-09-25 ENCOUNTER — NON-APPOINTMENT (OUTPATIENT)
Age: 83
End: 2023-09-25

## 2023-09-26 ENCOUNTER — APPOINTMENT (OUTPATIENT)
Dept: HEART AND VASCULAR | Facility: CLINIC | Age: 83
End: 2023-09-26
Payer: MEDICARE

## 2023-09-26 PROCEDURE — G2066: CPT

## 2023-09-26 PROCEDURE — 93298 REM INTERROG DEV EVAL SCRMS: CPT

## 2023-10-31 ENCOUNTER — APPOINTMENT (OUTPATIENT)
Dept: HEART AND VASCULAR | Facility: CLINIC | Age: 83
End: 2023-10-31
Payer: MEDICARE

## 2023-10-31 ENCOUNTER — NON-APPOINTMENT (OUTPATIENT)
Age: 83
End: 2023-10-31

## 2023-11-01 PROCEDURE — 93298 REM INTERROG DEV EVAL SCRMS: CPT

## 2023-11-01 PROCEDURE — G2066: CPT

## 2023-12-05 ENCOUNTER — APPOINTMENT (OUTPATIENT)
Dept: HEART AND VASCULAR | Facility: CLINIC | Age: 83
End: 2023-12-05
Payer: MEDICARE

## 2023-12-05 ENCOUNTER — NON-APPOINTMENT (OUTPATIENT)
Age: 83
End: 2023-12-05

## 2023-12-06 PROCEDURE — G2066: CPT

## 2023-12-06 PROCEDURE — 93298 REM INTERROG DEV EVAL SCRMS: CPT

## 2024-01-09 ENCOUNTER — NON-APPOINTMENT (OUTPATIENT)
Age: 84
End: 2024-01-09

## 2024-01-09 ENCOUNTER — APPOINTMENT (OUTPATIENT)
Dept: HEART AND VASCULAR | Facility: CLINIC | Age: 84
End: 2024-01-09
Payer: MEDICARE

## 2024-01-10 PROCEDURE — 93298 REM INTERROG DEV EVAL SCRMS: CPT

## 2024-02-13 ENCOUNTER — APPOINTMENT (OUTPATIENT)
Dept: HEART AND VASCULAR | Facility: CLINIC | Age: 84
End: 2024-02-13

## 2024-03-18 ENCOUNTER — APPOINTMENT (OUTPATIENT)
Dept: HEART AND VASCULAR | Facility: CLINIC | Age: 84
End: 2024-03-18

## 2024-04-19 ENCOUNTER — APPOINTMENT (OUTPATIENT)
Dept: HEART AND VASCULAR | Facility: CLINIC | Age: 84
End: 2024-04-19

## 2024-05-24 ENCOUNTER — APPOINTMENT (OUTPATIENT)
Dept: HEART AND VASCULAR | Facility: CLINIC | Age: 84
End: 2024-05-24

## 2024-07-10 NOTE — PROGRESS NOTE ADULT - SUBJECTIVE AND OBJECTIVE BOX
Pt here for C8D1 Drug(s)Keytruda.  Arrives Ambulating independently, accompanied by Spouse     Patient was evaluated today by MD and Treatment Nurse.    Oral medications included in this regimen:  no    Patient confirms comprehension of cancer treatment schedule:  yes    Pregnancy screening:  Denies possibility of pregnancy    Modifications in dose or schedule:  No    Medications appearance and physical integrity checked by RN: yes.    Chemotherapy IV pump settings verified by 2 RNs:  No due to targeted therapy IV administration.  Frequency of blood return and site check throughout administration: Prior to administration and At completion of therapy     Infusion/treatment outcome:  patient tolerated treatment without incident    Education Record    Learner:  Patient and Spouse  Barriers / Limitations:  None  Method:  Brief focused and Discussion  Education / instructions given:  Plan of care and next appointment reviewed. Pt's last appointment in [place]. Discussed with patient where she would prefer to have appointment scheduled. Pt states even though [place] closer, she prefers to come to [place].  Outcome:  Shows understanding    Discharged Home, Ambulating independently, accompanied by:Spouse in stable condition, no new complaints.    Patient/family verbalized understanding of future appointments: by printed AVS     INTERVAL HPI/OVERNIGHT EVENTS:    OpDay: Min Inv MV repair/PFO closure  EF 50%  Cardiologist: Dr. Caren aBrr    79 yo Female Hx HTN, MVP, hypercalcemia, severe MR with sxs SOB/GRAMAJO with palpitations and LE edema    ECHO 11/2020 EF 70%, mod-severe MR.  SARAH 12/9: severe MVP w/severe MR and flail segment of posterior leaflet.    Cath: 3/30: LM: normal in size and structure. LAD: mildly torturous. Lcx: no disease, RCA: no disease. Failed radial access. Pressure dressing on. Right groin PC.     To OR today - no intraop blood product. arrived to ICU intubated; no infusions  pacer on backup - not connected     PMHx includes but is not limited to:   HTN (hypertension)  HLD (hyperlipidemia)  Mitral regurgitation  Mitral prolapse    ICU Vital Signs Last 24 Hrs  T(C): 36.1 (31 Mar 2021 12:45), Max: 36.9 (30 Mar 2021 17:07)  T(F): 96.9 (31 Mar 2021 12:45), Max: 98.5 (30 Mar 2021 21:39)  HR: 89 (31 Mar 2021 12:45) (72 - 92) sinus   BP: 136/80 (31 Mar 2021 05:05) (136/80 - 153/90)  BP(mean): 102 (31 Mar 2021 05:05) (102 - 116)  ABP: 150/82 (31 Mar 2021 12:45) (150/82 - 150/82)  ABP(mean): 112 (31 Mar 2021 12:45) (112 - 112)  RR: 12 (31 Mar 2021 13:00) (12 - 18)  SpO2: 100% (31 Mar 2021 12:45) (93% - 100%) Fi02 60%    Qtts: None     I&O's Summary    31 Mar 2021 07:01  -  31 Mar 2021 12:56  --------------------------------------------------------  IN: 0 mL / OUT: 75 mL / NET: -75 mL    Physical Exam    Heart - regular (-)rub/gallop  Lungs - BS appreciated bilaterally - no rhonchi/wheeze  Abd - soft NTND (-)r/r/g  Ext - warm; chronic stasis changes LE bilaterally - 2+ peripheral palpable pulse right LE. 1+ LLE - no cyanosis/clubbing/edema  Chest - op bandage in place - 2 blakes and pleural   Neuro - pupils reactive to light; otherwise unable at this time  Skin - no rash     LABS:                        13.4   7.07  )-----------( 189      ( 31 Mar 2021 06:20 )             42.3     03-31    143  |  107  |  14  ----------------------------<  98  3.8   |  23  |  1.02    Ca    9.0      31 Mar 2021 06:20  Mg     1.9     03-31    TPro  7.1  /  Alb  4.1  /  TBili  0.5  /  DBili  x   /  AST  20  /  ALT  14  /  AlkPhos  71  03-30    PT/INR - ( 31 Mar 2021 06:20 )   PT: 11.7 sec;   INR: 0.97     PTT - ( 31 Mar 2021 06:20 )  PTT:29.8 sec    ABG - ( 31 Mar 2021 12:50 )  pH, Arterial: 7.44  pH, Blood: x     /  pCO2: 34    /  pO2: 130   / HCO3: 22    / Base Excess: -1.6  /  SaO2: 98        RADIOLOGY & ADDITIONAL STUDIES: reviewed     patient with symptomatic severe MR now OpDay s/p MV repair      I have spent/provided stated minutes of critical care time to this patient: 90  INTERVAL HPI/OVERNIGHT EVENTS:    OpDay: Min Inv MV repair/PFO closure  EF 50%  Cardiologist: Dr. Caren Barr    79 yo Female Hx HTN, MVP, hypercalcemia, severe MR with sxs SOB/GRAMAJO with palpitations and LE edema    ECHO 11/2020 EF 70%, mod-severe MR.  SARAH 12/9: severe MVP w/severe MR and flail segment of posterior leaflet.    Cath: 3/30: LM: normal in size and structure. LAD: mildly torturous. Lcx: no disease, RCA: no disease. Failed radial access. Pressure dressing on. Right groin PC.     To OR today - no intraop blood product. arrived to ICU intubated; no infusions  pacer on backup - not connected     PMHx includes but is not limited to:   HTN (hypertension)  HLD (hyperlipidemia)  Mitral regurgitation  Mitral prolapse    ICU Vital Signs Last 24 Hrs  T(C): 36.1 (31 Mar 2021 12:45), Max: 36.9 (30 Mar 2021 17:07)  T(F): 96.9 (31 Mar 2021 12:45), Max: 98.5 (30 Mar 2021 21:39)  HR: 89 (31 Mar 2021 12:45) (72 - 92) sinus   BP: 136/80 (31 Mar 2021 05:05) (136/80 - 153/90)  BP(mean): 102 (31 Mar 2021 05:05) (102 - 116)  ABP: 150/82 (31 Mar 2021 12:45) (150/82 - 150/82)  ABP(mean): 112 (31 Mar 2021 12:45) (112 - 112)  RR: 12 (31 Mar 2021 13:00) (12 - 18)  SpO2: 100% (31 Mar 2021 12:45) (93% - 100%) Fi02 60%    Qtts: None     I&O's Summary    31 Mar 2021 07:01  -  31 Mar 2021 12:56  --------------------------------------------------------  IN: 0 mL / OUT: 75 mL / NET: -75 mL    Physical Exam    Heart - regular (-)rub/gallop  Lungs - BS appreciated bilaterally - no rhonchi/wheeze  Abd - soft NTND (-)r/r/g  Ext - warm; chronic stasis changes LE bilaterally - 2+ peripheral palpable pulse right LE. 1+ LLE - no cyanosis/clubbing/edema  Chest - op bandage in place - 2 blakes and pleural   Neuro - pupils reactive to light; otherwise unable at this time  Skin - no rash     LABS:                        13.4   7.07  )-----------( 189      ( 31 Mar 2021 06:20 )             42.3     03-31    143  |  107  |  14  ----------------------------<  98  3.8   |  23  |  1.02    Ca    9.0      31 Mar 2021 06:20  Mg     1.9     03-31    TPro  7.1  /  Alb  4.1  /  TBili  0.5  /  DBili  x   /  AST  20  /  ALT  14  /  AlkPhos  71  03-30    PT/INR - ( 31 Mar 2021 06:20 )   PT: 11.7 sec;   INR: 0.97     PTT - ( 31 Mar 2021 06:20 )  PTT:29.8 sec    ABG - ( 31 Mar 2021 12:50 )  pH, Arterial: 7.44  pH, Blood: x     /  pCO2: 34    /  pO2: 130   / HCO3: 22    / Base Excess: -1.6  /  SaO2: 98        RADIOLOGY & ADDITIONAL STUDIES: reviewed     patient with symptomatic severe MR now OpDay s/p MV repair    1. CV  hemodynamically stable  sinus rhythm  pacer on backup   complete periop Abx prophylaxis  ASA    2. Pulm   serial ABG to optimize oxygenation and ventilation  anticipate wean to extubate in short post-o period  monitor chest tube output    glycemic control   reported difficult (anterior) airway requiring glidescope  DVT and GI prophylaxis    d/w anesthesia/staff and CTS    I have spent/provided stated minutes of critical care time to this patient: 90

## 2024-08-22 NOTE — PATIENT PROFILE ADULT - PATIENT REPRESENTATIVE: ( YOU CAN CHOOSE ANY PERSON THAT CAN ASSIST YOU WITH YOUR HEALTH CARE PREFERENCES, DOES NOT HAVE TO BE A SPOUSE, IMMEDIATE FAMILY OR SIGNIFICANT OTHER/PARTNER)
Quality 47: Advance Care Plan: Advance care planning not documented, reason not otherwise specified.
Detail Level: Detailed
same name as above